# Patient Record
Sex: MALE | Race: WHITE | NOT HISPANIC OR LATINO | URBAN - METROPOLITAN AREA
[De-identification: names, ages, dates, MRNs, and addresses within clinical notes are randomized per-mention and may not be internally consistent; named-entity substitution may affect disease eponyms.]

---

## 2020-12-03 ENCOUNTER — OFFICE (AMBULATORY)
Dept: URBAN - METROPOLITAN AREA CLINIC 64 | Facility: CLINIC | Age: 75
End: 2020-12-03
Payer: COMMERCIAL

## 2020-12-03 VITALS
SYSTOLIC BLOOD PRESSURE: 157 MMHG | DIASTOLIC BLOOD PRESSURE: 83 MMHG | HEART RATE: 75 BPM | WEIGHT: 250 LBS | HEIGHT: 73 IN

## 2020-12-03 DIAGNOSIS — R13.10 DYSPHAGIA, UNSPECIFIED: ICD-10-CM

## 2020-12-03 DIAGNOSIS — Z86.010 PERSONAL HISTORY OF COLONIC POLYPS: ICD-10-CM

## 2020-12-03 DIAGNOSIS — R63.4 ABNORMAL WEIGHT LOSS: ICD-10-CM

## 2020-12-03 PROCEDURE — 99204 OFFICE O/P NEW MOD 45 MIN: CPT | Performed by: INTERNAL MEDICINE

## 2020-12-03 RX ORDER — PANTOPRAZOLE SODIUM 40 MG/1
40 TABLET, DELAYED RELEASE ORAL
Qty: 90 | Refills: 3 | Status: COMPLETED
Start: 2020-12-03 | End: 2020-12-10

## 2020-12-11 ENCOUNTER — ON CAMPUS - OUTPATIENT (AMBULATORY)
Dept: URBAN - METROPOLITAN AREA HOSPITAL 2 | Facility: HOSPITAL | Age: 75
End: 2020-12-11
Payer: COMMERCIAL

## 2020-12-11 ENCOUNTER — OFFICE (AMBULATORY)
Dept: URBAN - METROPOLITAN AREA PATHOLOGY 4 | Facility: PATHOLOGY | Age: 75
End: 2020-12-11
Payer: MEDICARE

## 2020-12-11 ENCOUNTER — OFFICE (AMBULATORY)
Dept: URBAN - METROPOLITAN AREA PATHOLOGY 4 | Facility: PATHOLOGY | Age: 75
End: 2020-12-11
Payer: COMMERCIAL

## 2020-12-11 VITALS
DIASTOLIC BLOOD PRESSURE: 88 MMHG | HEART RATE: 66 BPM | SYSTOLIC BLOOD PRESSURE: 142 MMHG | OXYGEN SATURATION: 97 % | HEART RATE: 62 BPM | OXYGEN SATURATION: 99 % | WEIGHT: 251 LBS | DIASTOLIC BLOOD PRESSURE: 92 MMHG | OXYGEN SATURATION: 96 % | RESPIRATION RATE: 16 BRPM | SYSTOLIC BLOOD PRESSURE: 152 MMHG | SYSTOLIC BLOOD PRESSURE: 162 MMHG | HEART RATE: 63 BPM | RESPIRATION RATE: 18 BRPM | SYSTOLIC BLOOD PRESSURE: 150 MMHG | DIASTOLIC BLOOD PRESSURE: 73 MMHG | DIASTOLIC BLOOD PRESSURE: 78 MMHG | HEIGHT: 73 IN | DIASTOLIC BLOOD PRESSURE: 80 MMHG | HEART RATE: 73 BPM | RESPIRATION RATE: 17 BRPM | SYSTOLIC BLOOD PRESSURE: 174 MMHG | TEMPERATURE: 96.9 F | OXYGEN SATURATION: 100 % | HEART RATE: 65 BPM

## 2020-12-11 DIAGNOSIS — K29.60 OTHER GASTRITIS WITHOUT BLEEDING: ICD-10-CM

## 2020-12-11 DIAGNOSIS — K44.9 DIAPHRAGMATIC HERNIA WITHOUT OBSTRUCTION OR GANGRENE: ICD-10-CM

## 2020-12-11 DIAGNOSIS — R63.4 ABNORMAL WEIGHT LOSS: ICD-10-CM

## 2020-12-11 DIAGNOSIS — R13.10 DYSPHAGIA, UNSPECIFIED: ICD-10-CM

## 2020-12-11 DIAGNOSIS — K22.2 ESOPHAGEAL OBSTRUCTION: ICD-10-CM

## 2020-12-11 DIAGNOSIS — K29.70 GASTRITIS, UNSPECIFIED, WITHOUT BLEEDING: ICD-10-CM

## 2020-12-11 PROBLEM — K31.89 OTHER DISEASES OF STOMACH AND DUODENUM: Status: ACTIVE | Noted: 2020-12-11

## 2020-12-11 LAB
GI HISTOLOGY: A. SELECT: (no result)
GI HISTOLOGY: PDF REPORT: (no result)

## 2020-12-11 PROCEDURE — 43450 DILATE ESOPHAGUS 1/MULT PASS: CPT | Performed by: INTERNAL MEDICINE

## 2020-12-11 PROCEDURE — 88305 TISSUE EXAM BY PATHOLOGIST: CPT | Mod: 26 | Performed by: INTERNAL MEDICINE

## 2020-12-11 PROCEDURE — 43239 EGD BIOPSY SINGLE/MULTIPLE: CPT | Performed by: INTERNAL MEDICINE

## 2021-03-22 ENCOUNTER — ON CAMPUS - OUTPATIENT (AMBULATORY)
Dept: URBAN - METROPOLITAN AREA HOSPITAL 2 | Facility: HOSPITAL | Age: 76
End: 2021-03-22
Payer: COMMERCIAL

## 2021-03-22 ENCOUNTER — OFFICE (AMBULATORY)
Dept: URBAN - METROPOLITAN AREA PATHOLOGY 4 | Facility: PATHOLOGY | Age: 76
End: 2021-03-22
Payer: COMMERCIAL

## 2021-03-22 VITALS
SYSTOLIC BLOOD PRESSURE: 117 MMHG | HEART RATE: 109 BPM | OXYGEN SATURATION: 100 % | SYSTOLIC BLOOD PRESSURE: 139 MMHG | SYSTOLIC BLOOD PRESSURE: 145 MMHG | HEART RATE: 65 BPM | DIASTOLIC BLOOD PRESSURE: 63 MMHG | WEIGHT: 249 LBS | HEART RATE: 57 BPM | OXYGEN SATURATION: 99 % | HEART RATE: 52 BPM | DIASTOLIC BLOOD PRESSURE: 97 MMHG | RESPIRATION RATE: 18 BRPM | TEMPERATURE: 97.7 F | DIASTOLIC BLOOD PRESSURE: 94 MMHG | SYSTOLIC BLOOD PRESSURE: 168 MMHG | DIASTOLIC BLOOD PRESSURE: 72 MMHG | HEIGHT: 73 IN | DIASTOLIC BLOOD PRESSURE: 87 MMHG | HEART RATE: 61 BPM | RESPIRATION RATE: 16 BRPM | SYSTOLIC BLOOD PRESSURE: 122 MMHG | OXYGEN SATURATION: 97 % | HEART RATE: 54 BPM | DIASTOLIC BLOOD PRESSURE: 92 MMHG | DIASTOLIC BLOOD PRESSURE: 73 MMHG | SYSTOLIC BLOOD PRESSURE: 141 MMHG | DIASTOLIC BLOOD PRESSURE: 98 MMHG | SYSTOLIC BLOOD PRESSURE: 138 MMHG | HEART RATE: 60 BPM | HEART RATE: 53 BPM | SYSTOLIC BLOOD PRESSURE: 126 MMHG

## 2021-03-22 DIAGNOSIS — D12.0 BENIGN NEOPLASM OF CECUM: ICD-10-CM

## 2021-03-22 DIAGNOSIS — D12.3 BENIGN NEOPLASM OF TRANSVERSE COLON: ICD-10-CM

## 2021-03-22 DIAGNOSIS — D12.2 BENIGN NEOPLASM OF ASCENDING COLON: ICD-10-CM

## 2021-03-22 DIAGNOSIS — D17.79 BENIGN LIPOMATOUS NEOPLASM OF OTHER SITES: ICD-10-CM

## 2021-03-22 DIAGNOSIS — K64.8 OTHER HEMORRHOIDS: ICD-10-CM

## 2021-03-22 DIAGNOSIS — Z86.010 PERSONAL HISTORY OF COLONIC POLYPS: ICD-10-CM

## 2021-03-22 PROBLEM — K63.5 POLYP OF COLON: Status: ACTIVE | Noted: 2021-03-22

## 2021-03-22 LAB
GI HISTOLOGY: A. UNSPECIFIED: (no result)
GI HISTOLOGY: B. UNSPECIFIED: (no result)
GI HISTOLOGY: C. UNSPECIFIED: (no result)
GI HISTOLOGY: PDF REPORT: (no result)

## 2021-03-22 PROCEDURE — 45379 COLONOSCOPY W/FB REMOVAL: CPT | Mod: PT,59 | Performed by: INTERNAL MEDICINE

## 2021-03-22 PROCEDURE — 88305 TISSUE EXAM BY PATHOLOGIST: CPT | Mod: 26 | Performed by: INTERNAL MEDICINE

## 2021-03-22 PROCEDURE — 45385 COLONOSCOPY W/LESION REMOVAL: CPT | Mod: PT | Performed by: INTERNAL MEDICINE

## 2021-03-22 PROCEDURE — 45379 COLONOSCOPY W/FB REMOVAL: CPT | Mod: 59,PT | Performed by: INTERNAL MEDICINE

## 2021-03-22 NOTE — SERVICEHPINOTES
THERESE RAMOS  is a  75  male   who presents today for a  Colonoscopy   for   the indications listed below. The updated Patient Profile was reviewed prior to the procedure, in conjunction with the Physical Exam, including medical conditions, surgical procedures, medications, allergies, family history and social history. See Physical Exam time stamp below for date and time of HPI completion.Pre-operatively, I reviewed the indication(s) for the procedure, the risks of the procedure [including but not limited to: unexpected bleeding possibly requiring hospitalization and/or unplanned repeat procedures, perforation possibly requiring surgical treatment, missed lesions and complications of sedation/MAC (also explained by anesthesia staff)]. I have evaluated the patient for risks associated with the planned anesthesia and the procedure to be performed and find the patient an acceptable candidate for IV sedation.Multiple opportunities were provided for any questions or concerns, and all questions were answered satisfactorily before any anesthesia was administered. We will proceed with the planned procedure.BR

## 2024-03-19 ENCOUNTER — ANESTHESIA EVENT (OUTPATIENT)
Age: 79
End: 2024-03-19
Payer: MEDICARE

## 2024-03-19 PROCEDURE — 93005 ELECTROCARDIOGRAM TRACING: CPT | Performed by: STUDENT IN AN ORGANIZED HEALTH CARE EDUCATION/TRAINING PROGRAM

## 2024-03-22 ENCOUNTER — HOSPITAL ENCOUNTER (OUTPATIENT)
Dept: CARDIOLOGY | Facility: HOSPITAL | Age: 79
Discharge: HOME | End: 2024-03-22
Payer: MEDICARE

## 2024-03-22 ENCOUNTER — LAB (OUTPATIENT)
Dept: LAB | Facility: HOSPITAL | Age: 79
End: 2024-03-22
Payer: MEDICARE

## 2024-03-22 PROCEDURE — 93005 ELECTROCARDIOGRAM TRACING: CPT

## 2024-03-22 PROCEDURE — 80048 BASIC METABOLIC PNL TOTAL CA: CPT | Performed by: OPHTHALMOLOGY

## 2024-03-26 ENCOUNTER — HOSPITAL ENCOUNTER (OUTPATIENT)
Facility: HOSPITAL | Age: 79
Setting detail: OBSERVATION
Discharge: HOME | End: 2024-03-27
Attending: EMERGENCY MEDICINE | Admitting: EMERGENCY MEDICINE
Payer: MEDICARE

## 2024-03-26 ENCOUNTER — APPOINTMENT (OUTPATIENT)
Dept: GENERAL RADIOLOGY | Facility: HOSPITAL | Age: 79
End: 2024-03-26
Payer: MEDICARE

## 2024-03-26 ENCOUNTER — ANESTHESIA (OUTPATIENT)
Age: 79
End: 2024-03-26
Payer: MEDICARE

## 2024-03-26 DIAGNOSIS — H02.402 PTOSIS OF LEFT EYELID: ICD-10-CM

## 2024-03-26 DIAGNOSIS — R06.09 DOE (DYSPNEA ON EXERTION): ICD-10-CM

## 2024-03-26 DIAGNOSIS — I48.91 NEW ONSET ATRIAL FIBRILLATION: Primary | ICD-10-CM

## 2024-03-26 LAB
ALBUMIN SERPL-MCNC: 3.9 G/DL (ref 3.5–5.2)
ALBUMIN/GLOB SERPL: 2.1 G/DL
ALP SERPL-CCNC: 43 U/L (ref 39–117)
ALT SERPL W P-5'-P-CCNC: 8 U/L (ref 1–41)
ANION GAP SERPL CALCULATED.3IONS-SCNC: 8 MMOL/L (ref 5–15)
AST SERPL-CCNC: 9 U/L (ref 1–40)
BASOPHILS # BLD AUTO: 0.04 10*3/MM3 (ref 0–0.2)
BASOPHILS NFR BLD AUTO: 0.5 % (ref 0–1.5)
BILIRUB SERPL-MCNC: 0.6 MG/DL (ref 0–1.2)
BUN SERPL-MCNC: 19 MG/DL (ref 8–23)
BUN/CREAT SERPL: 20 (ref 7–25)
CALCIUM SPEC-SCNC: 9.4 MG/DL (ref 8.6–10.5)
CHLORIDE SERPL-SCNC: 106 MMOL/L (ref 98–107)
CHOLEST SERPL-MCNC: 157 MG/DL (ref 0–200)
CO2 SERPL-SCNC: 28 MMOL/L (ref 22–29)
CREAT SERPL-MCNC: 0.95 MG/DL (ref 0.76–1.27)
DEPRECATED RDW RBC AUTO: 44.9 FL (ref 37–54)
EGFRCR SERPLBLD CKD-EPI 2021: 80.9 ML/MIN/1.73
EOSINOPHIL # BLD AUTO: 0.06 10*3/MM3 (ref 0–0.4)
EOSINOPHIL NFR BLD AUTO: 0.7 % (ref 0.3–6.2)
ERYTHROCYTE [DISTWIDTH] IN BLOOD BY AUTOMATED COUNT: 12.7 % (ref 12.3–15.4)
GEN 5 2HR TROPONIN T REFLEX: 35 NG/L
GLOBULIN UR ELPH-MCNC: 1.9 GM/DL
GLUCOSE SERPL-MCNC: 108 MG/DL (ref 65–99)
HBA1C MFR BLD: 5.2 % (ref 4.8–5.6)
HCT VFR BLD AUTO: 36.5 % (ref 37.5–51)
HDLC SERPL-MCNC: 56 MG/DL (ref 40–60)
HGB BLD-MCNC: 11.6 G/DL (ref 13–17.7)
HOLD SPECIMEN: NORMAL
HOLD SPECIMEN: NORMAL
IMM GRANULOCYTES # BLD AUTO: 0.02 10*3/MM3 (ref 0–0.05)
IMM GRANULOCYTES NFR BLD AUTO: 0.2 % (ref 0–0.5)
LDLC SERPL CALC-MCNC: 85 MG/DL (ref 0–100)
LDLC/HDLC SERPL: 1.51 {RATIO}
LYMPHOCYTES # BLD AUTO: 2.32 10*3/MM3 (ref 0.7–3.1)
LYMPHOCYTES NFR BLD AUTO: 27 % (ref 19.6–45.3)
MAGNESIUM SERPL-MCNC: 2 MG/DL (ref 1.6–2.4)
MCH RBC QN AUTO: 30.5 PG (ref 26.6–33)
MCHC RBC AUTO-ENTMCNC: 31.8 G/DL (ref 31.5–35.7)
MCV RBC AUTO: 96.1 FL (ref 79–97)
MONOCYTES # BLD AUTO: 0.53 10*3/MM3 (ref 0.1–0.9)
MONOCYTES NFR BLD AUTO: 6.2 % (ref 5–12)
NEUTROPHILS NFR BLD AUTO: 5.61 10*3/MM3 (ref 1.7–7)
NEUTROPHILS NFR BLD AUTO: 65.4 % (ref 42.7–76)
NRBC BLD AUTO-RTO: 0 /100 WBC (ref 0–0.2)
NT-PROBNP SERPL-MCNC: 2025 PG/ML (ref 0–1800)
PLATELET # BLD AUTO: 189 10*3/MM3 (ref 140–450)
PMV BLD AUTO: 9.6 FL (ref 6–12)
POTASSIUM SERPL-SCNC: 4 MMOL/L (ref 3.5–5.2)
PROT SERPL-MCNC: 5.8 G/DL (ref 6–8.5)
RBC # BLD AUTO: 3.8 10*6/MM3 (ref 4.14–5.8)
SODIUM SERPL-SCNC: 142 MMOL/L (ref 136–145)
T4 FREE SERPL-MCNC: 1.25 NG/DL (ref 0.93–1.7)
TRIGL SERPL-MCNC: 83 MG/DL (ref 0–150)
TROPONIN T DELTA: -6 NG/L
TROPONIN T SERPL HS-MCNC: 41 NG/L
TSH SERPL DL<=0.05 MIU/L-ACNC: 0.52 UIU/ML (ref 0.27–4.2)
VLDLC SERPL-MCNC: 16 MG/DL (ref 5–40)
WBC NRBC COR # BLD AUTO: 8.58 10*3/MM3 (ref 3.4–10.8)
WHOLE BLOOD HOLD COAG: NORMAL
WHOLE BLOOD HOLD SPECIMEN: NORMAL

## 2024-03-26 PROCEDURE — 80061 LIPID PANEL: CPT | Performed by: NURSE PRACTITIONER

## 2024-03-26 PROCEDURE — 99204 OFFICE O/P NEW MOD 45 MIN: CPT | Performed by: INTERNAL MEDICINE

## 2024-03-26 PROCEDURE — 93005 ELECTROCARDIOGRAM TRACING: CPT

## 2024-03-26 PROCEDURE — 96372 THER/PROPH/DIAG INJ SC/IM: CPT

## 2024-03-26 PROCEDURE — 99285 EMERGENCY DEPT VISIT HI MDM: CPT

## 2024-03-26 PROCEDURE — 71045 X-RAY EXAM CHEST 1 VIEW: CPT

## 2024-03-26 PROCEDURE — 84484 ASSAY OF TROPONIN QUANT: CPT | Performed by: EMERGENCY MEDICINE

## 2024-03-26 PROCEDURE — 84443 ASSAY THYROID STIM HORMONE: CPT | Performed by: NURSE PRACTITIONER

## 2024-03-26 PROCEDURE — 83036 HEMOGLOBIN GLYCOSYLATED A1C: CPT | Performed by: NURSE PRACTITIONER

## 2024-03-26 PROCEDURE — 84484 ASSAY OF TROPONIN QUANT: CPT | Performed by: NURSE PRACTITIONER

## 2024-03-26 PROCEDURE — 25010000002 ENOXAPARIN PER 10 MG: Performed by: NURSE PRACTITIONER

## 2024-03-26 PROCEDURE — G0378 HOSPITAL OBSERVATION PER HR: HCPCS

## 2024-03-26 PROCEDURE — 96376 TX/PRO/DX INJ SAME DRUG ADON: CPT

## 2024-03-26 PROCEDURE — 84439 ASSAY OF FREE THYROXINE: CPT | Performed by: NURSE PRACTITIONER

## 2024-03-26 PROCEDURE — 83880 ASSAY OF NATRIURETIC PEPTIDE: CPT | Performed by: EMERGENCY MEDICINE

## 2024-03-26 PROCEDURE — 83735 ASSAY OF MAGNESIUM: CPT | Performed by: NURSE PRACTITIONER

## 2024-03-26 PROCEDURE — 25010000002 FUROSEMIDE PER 20 MG: Performed by: NURSE PRACTITIONER

## 2024-03-26 PROCEDURE — 80053 COMPREHEN METABOLIC PANEL: CPT | Performed by: EMERGENCY MEDICINE

## 2024-03-26 PROCEDURE — 96374 THER/PROPH/DIAG INJ IV PUSH: CPT

## 2024-03-26 PROCEDURE — 85025 COMPLETE CBC W/AUTO DIFF WBC: CPT | Performed by: EMERGENCY MEDICINE

## 2024-03-26 RX ORDER — TAMSULOSIN HYDROCHLORIDE 0.4 MG/1
0.8 CAPSULE ORAL DAILY
Status: DISCONTINUED | OUTPATIENT
Start: 2024-03-26 | End: 2024-03-27 | Stop reason: HOSPADM

## 2024-03-26 RX ORDER — SODIUM CHLORIDE 0.9 % (FLUSH) 0.9 %
10 SYRINGE (ML) INJECTION AS NEEDED
Status: DISCONTINUED | OUTPATIENT
Start: 2024-03-26 | End: 2024-03-27 | Stop reason: HOSPADM

## 2024-03-26 RX ORDER — SODIUM CHLORIDE 9 MG/ML
40 INJECTION, SOLUTION INTRAVENOUS AS NEEDED
Status: DISCONTINUED | OUTPATIENT
Start: 2024-03-26 | End: 2024-03-27 | Stop reason: HOSPADM

## 2024-03-26 RX ORDER — ONDANSETRON 2 MG/ML
4 INJECTION INTRAMUSCULAR; INTRAVENOUS EVERY 6 HOURS PRN
Status: DISCONTINUED | OUTPATIENT
Start: 2024-03-26 | End: 2024-03-27 | Stop reason: HOSPADM

## 2024-03-26 RX ORDER — BISACODYL 10 MG
10 SUPPOSITORY, RECTAL RECTAL DAILY PRN
Status: DISCONTINUED | OUTPATIENT
Start: 2024-03-26 | End: 2024-03-27 | Stop reason: HOSPADM

## 2024-03-26 RX ORDER — FUROSEMIDE 10 MG/ML
40 INJECTION INTRAMUSCULAR; INTRAVENOUS EVERY 12 HOURS
Status: DISCONTINUED | OUTPATIENT
Start: 2024-03-26 | End: 2024-03-27 | Stop reason: HOSPADM

## 2024-03-26 RX ORDER — ONDANSETRON 4 MG/1
4 TABLET, ORALLY DISINTEGRATING ORAL EVERY 6 HOURS PRN
Status: DISCONTINUED | OUTPATIENT
Start: 2024-03-26 | End: 2024-03-27 | Stop reason: HOSPADM

## 2024-03-26 RX ORDER — PANTOPRAZOLE SODIUM 40 MG/10ML
40 INJECTION, POWDER, LYOPHILIZED, FOR SOLUTION INTRAVENOUS
Status: DISCONTINUED | OUTPATIENT
Start: 2024-03-27 | End: 2024-03-27 | Stop reason: HOSPADM

## 2024-03-26 RX ORDER — BISACODYL 5 MG/1
5 TABLET, DELAYED RELEASE ORAL DAILY PRN
Status: DISCONTINUED | OUTPATIENT
Start: 2024-03-26 | End: 2024-03-27 | Stop reason: HOSPADM

## 2024-03-26 RX ORDER — POLYETHYLENE GLYCOL 3350 17 G/17G
17 POWDER, FOR SOLUTION ORAL DAILY PRN
Status: DISCONTINUED | OUTPATIENT
Start: 2024-03-26 | End: 2024-03-27 | Stop reason: HOSPADM

## 2024-03-26 RX ORDER — AMOXICILLIN 250 MG
2 CAPSULE ORAL 2 TIMES DAILY PRN
Status: DISCONTINUED | OUTPATIENT
Start: 2024-03-26 | End: 2024-03-27 | Stop reason: HOSPADM

## 2024-03-26 RX ORDER — SODIUM CHLORIDE 0.9 % (FLUSH) 0.9 %
10 SYRINGE (ML) INJECTION EVERY 12 HOURS SCHEDULED
Status: DISCONTINUED | OUTPATIENT
Start: 2024-03-26 | End: 2024-03-27 | Stop reason: HOSPADM

## 2024-03-26 RX ORDER — HYDRALAZINE HYDROCHLORIDE 20 MG/ML
10 INJECTION INTRAMUSCULAR; INTRAVENOUS EVERY 6 HOURS PRN
Status: DISCONTINUED | OUTPATIENT
Start: 2024-03-26 | End: 2024-03-27 | Stop reason: HOSPADM

## 2024-03-26 RX ORDER — ENOXAPARIN SODIUM 100 MG/ML
40 INJECTION SUBCUTANEOUS DAILY
Status: DISCONTINUED | OUTPATIENT
Start: 2024-03-26 | End: 2024-03-27 | Stop reason: HOSPADM

## 2024-03-26 RX ORDER — ASPIRIN 81 MG/1
81 TABLET ORAL DAILY
Status: DISCONTINUED | OUTPATIENT
Start: 2024-03-27 | End: 2024-03-27 | Stop reason: HOSPADM

## 2024-03-26 RX ORDER — NITROGLYCERIN 0.4 MG/1
0.4 TABLET SUBLINGUAL
Status: DISCONTINUED | OUTPATIENT
Start: 2024-03-26 | End: 2024-03-27 | Stop reason: HOSPADM

## 2024-03-26 RX ORDER — ACETAMINOPHEN 325 MG/1
650 TABLET ORAL EVERY 4 HOURS PRN
Status: DISCONTINUED | OUTPATIENT
Start: 2024-03-26 | End: 2024-03-27 | Stop reason: HOSPADM

## 2024-03-26 RX ORDER — HYDROCODONE BITARTRATE AND ACETAMINOPHEN 10; 325 MG/1; MG/1
1 TABLET ORAL EVERY 6 HOURS PRN
Status: DISCONTINUED | OUTPATIENT
Start: 2024-03-26 | End: 2024-03-27 | Stop reason: HOSPADM

## 2024-03-26 RX ORDER — FUROSEMIDE 10 MG/ML
40 INJECTION INTRAMUSCULAR; INTRAVENOUS ONCE
Status: COMPLETED | OUTPATIENT
Start: 2024-03-26 | End: 2024-03-26

## 2024-03-26 RX ADMIN — ENOXAPARIN SODIUM 40 MG: 100 INJECTION SUBCUTANEOUS at 13:57

## 2024-03-26 RX ADMIN — HYDROCODONE BITARTRATE AND ACETAMINOPHEN 1 TABLET: 10; 325 TABLET ORAL at 18:17

## 2024-03-26 RX ADMIN — Medication 10 ML: at 22:10

## 2024-03-26 RX ADMIN — Medication 10 ML: at 13:14

## 2024-03-26 RX ADMIN — FUROSEMIDE 40 MG: 10 INJECTION, SOLUTION INTRAMUSCULAR; INTRAVENOUS at 18:17

## 2024-03-26 RX ADMIN — FUROSEMIDE 40 MG: 10 INJECTION, SOLUTION INTRAMUSCULAR; INTRAVENOUS at 13:56

## 2024-03-26 RX ADMIN — TAMSULOSIN HYDROCHLORIDE 0.8 MG: 0.4 CAPSULE ORAL at 22:10

## 2024-03-26 NOTE — ED PROVIDER NOTES
Subjective   History of Present Illness  Chief Complaint: Abnormal EKG    Patient is a 78-year-old male with history of CAD GERD presents to the ER with reports of abnormal EKG.  Patient states that he was at the surgical center to have surgery on his right thigh.  Patient was in preop when they noticed that he had an abnormal EKG and reported dyspnea on exertion.  Patient did not have the surgery was sent to the ER for evaluation.  Patient EKG reads atrial fibrillation with a rate of 73.  He denies any chest pain at this time.  Patient did receive 324 mg aspirin prior to ER arrival.  No history of A-fib per family.  He denies any shortness of breath currently but states that he does have shortness of breath with exertion.  He has some mild lower extremity swelling.  No abdominal pain nausea vomiting diarrhea.  No fever or chills.  Patient states he does not have a cardiologist.    PCP: Sybil Fleming    History provided by:  Patient      Review of Systems   Constitutional:  Negative for fever.   HENT:  Negative for sore throat and trouble swallowing.    Eyes: Negative.    Respiratory:  Positive for shortness of breath. Negative for chest tightness and wheezing.    Cardiovascular:  Positive for palpitations. Negative for chest pain.   Gastrointestinal:  Negative for abdominal pain, diarrhea, nausea and vomiting.   Endocrine: Negative.    Genitourinary:  Negative for dysuria.   Musculoskeletal:  Negative for myalgias.   Skin:  Negative for rash.   Allergic/Immunologic: Negative.    Neurological:  Negative for weakness and headaches.   Psychiatric/Behavioral:  Negative for behavioral problems.    All other systems reviewed and are negative.      Past Medical History:   Diagnosis Date    Arthritis     Back pain     Blind right eye     CAD (coronary artery disease)     Esophagus disorder     several dilations    GERD (gastroesophageal reflux disease)     History of BPH     [place] (hard of hearing)     Lumbar stenosis with  neurogenic claudication     Seasonal allergies        No Known Allergies    Past Surgical History:   Procedure Laterality Date    BACK SURGERY      x3    CHOLECYSTECTOMY      COLONOSCOPY W/ POLYPECTOMY      CORONARY ANGIOPLASTY WITH STENT PLACEMENT      2013    ESOPHAGEAL DILATATION      EYE SURGERY      SPINAL FUSION      TOTAL HIP ARTHROPLASTY         Family History   Problem Relation Age of Onset    Malig Hyperthermia Neg Hx        Social History     Socioeconomic History    Marital status:    Tobacco Use    Smoking status: Never   Vaping Use    Vaping status: Never Used   Substance and Sexual Activity    Alcohol use: Never    Drug use: Never    Sexual activity: Defer           Objective   Physical Exam  Vitals and nursing note reviewed.   Constitutional:       Appearance: He is well-developed. He is obese.   Eyes:      Pupils: Pupils are equal, round, and reactive to light.      Comments: Blind right eye   Cardiovascular:      Rate and Rhythm: Normal rate. Rhythm irregular.      Heart sounds: Normal heart sounds. No murmur heard.  Pulmonary:      Breath sounds: Normal breath sounds. No wheezing or rhonchi.   Chest:      Chest wall: No tenderness.   Abdominal:      Palpations: Abdomen is soft.      Tenderness: There is no abdominal tenderness.   Musculoskeletal:      Cervical back: Normal range of motion.      Right lower leg: Edema present.      Left lower leg: Edema present.   Skin:     General: Skin is warm.      Capillary Refill: Capillary refill takes less than 2 seconds.   Neurological:      General: No focal deficit present.      Mental Status: He is alert and oriented to person, place, and time.   Psychiatric:         Mood and Affect: Mood normal.         Behavior: Behavior normal.         ECG 12 Lead      Date/Time: 3/26/2024 10:53 AM    Performed by: Beti Monaco PA  Authorized by: Logan Caruso MD  Interpreted by ED physician  Previous ECG: no previous ECG available  Rhythm: atrial  "fibrillation  Rate: normal  BPM: 87  QRS axis: normal  Conduction: conduction normal  Clinical impression: abnormal ECG               ED Course  ED Course as of 03/26/24 1258   Tue Mar 26, 2024   1113 Spoke with Sulma Mojica NP who agreed to accept patient for observation admission. [MC]      ED Course User Index  [MC] Tho[place] Lily, PA    /69   Pulse 61   Temp 98.2 °F (36.8 °C) (Oral)   Resp 18   Ht 182.9 cm (72\")   Wt 110 kg (242 lb 8.1 oz)   SpO2 98%   BMI 32.89 kg/m²   Labs Reviewed   BNP (IN-HOUSE) - Abnormal; Notable for the following components:       Result Value    proBNP 2,025.0 (*)     All other components within normal limits    Narrative:     This assay is used as an aid in the diagnosis of individuals suspected of having heart failure. It can be used as an aid in the diagnosis of acute decompensated heart failure (ADHF) in patients presenting with signs and symptoms of ADHF to the emergency department (ED). In addition, NT-proBNP of <300 pg/mL indicates ADHF is not likely.    Age Range Result Interpretation  NT-proBNP Concentration (pg/mL:      <50             Positive            >450                   Gray                 300-450                    Negative             <300    50-75           Positive            >900                  Gray                300-900                  Negative            <300      >75             Positive            >1800                  Gray                300-1800                  Negative            <300   COMPREHENSIVE METABOLIC PANEL - Abnormal; Notable for the following components:    Glucose 108 (*)     Total Protein 5.8 (*)     All other components within normal limits    Narrative:     GFR Normal >60  Chronic Kidney Disease <60  Kidney Failure <15    The GFR formula is only valid for adults with stable renal function between ages 18 and 70.   TROPONIN - Abnormal; Notable for the following components:    HS Troponin T 41 (*)     All other components " within normal limits    Narrative:     High Sensitive Troponin T Reference Range:  <14.0 ng/L- Negative Female for AMI  <22.0 ng/L- Negative Male for AMI  >=14 - Abnormal Female indicating possible myocardial injury.  >=22 - Abnormal Male indicating possible myocardial injury.   Clinicians would have to utilize clinical acumen, EKG, Troponin, and serial changes to determine if it is an Acute Myocardial Infarction or myocardial injury due to an underlying chronic condition.        CBC WITH AUTO DIFFERENTIAL - Abnormal; Notable for the following components:    RBC 3.80 (*)     Hemoglobin 11.6 (*)     Hematocrit 36.5 (*)     All other components within normal limits   RAINBOW DRAW    Narrative:     The following orders were created for panel order [place] Draw.  Procedure                               Abnormality         Status                     ---------                               -----------         ------                     Green Top (Gel)[[id number]]                                  Final result               Lavender Top[[id number]]                                     Final result               Gold Top - SST[[id number]]                                   Final result               Light Blue Top[[id number]]                                   Final result                 Please view results for these tests on the individual orders.   HIGH SENSITIVITIY TROPONIN T 2HR   TSH   T4, FREE   GREEN TOP   LAVENDER TOP   GOLD TOP - SST   LIGHT BLUE TOP   CBC AND DIFFERENTIAL    Narrative:     The following orders were created for panel order CBC & Differential.  Procedure                               Abnormality         Status                     ---------                               -----------         ------                     CBC Auto Differential[[id number]]        Abnormal            Final result                 Please view results for these tests on the individual orders.     Medications   sodium chloride 0.9 %  flush 10 mL (has no administration in time range)   sodium chloride 0.9 % flush 10 mL (has no administration in time range)   sodium chloride 0.9 % flush 10 mL (has no administration in time range)   sodium chloride 0.9 % flush 10 mL (has no administration in time range)   sodium chloride 0.9 % infusion 40 mL (has no administration in time range)   Enoxaparin Sodium (LOVENOX) syringe 40 mg (has no administration in time range)   nitroglycerin (NITROSTAT) SL tablet 0.4 mg (has no administration in time range)   sennosides-docusate (PERICOLACE) 8.6-50 MG per tablet 2 tablet (has no administration in time range)     And   polyethylene glycol (MIRALAX) packet 17 g (has no administration in time range)     And   bisacodyl (DULCOLAX) EC tablet 5 mg (has no administration in time range)     And   bisacodyl (DULCOLAX) suppository 10 mg (has no administration in time range)   acetaminophen (TYLENOL) tablet 650 mg (has no administration in time range)   ondansetron ODT (ZOFRAN-ODT) disintegrating tablet 4 mg (has no administration in time range)     Or   ondansetron (ZOFRAN) injection 4 mg (has no administration in time range)   HYDROcodone-acetaminophen (NORCO)  MG per tablet 1 tablet (has no administration in time range)   tamsulosin (FLOMAX) 24 hr capsule 0.8 mg (has no administration in time range)     XR Chest 1 View    Result Date: 3/26/2024  Impression: Somewhat limited exam. Stable cardiomegaly. No acute process. Electronically Signed: Saskia Fernandez MD  3/26/2024 11:11 AM EDT  Workstation ID: [id number]                                            Medical Decision Making  Differential Dx (Includes but not limited to): Dysrhythmia, electro abnormality, CHF exacerbation pneumonia  Medical Records Reviewed: No recent hospital admission.  No recent cardiac workup.  No recent stress test or echo.  Labs: On my interpretation, CBC no leukocytosis.  Troponin 41.  CMP glucose 108.  BNP 2025  Imaging: On my interpretation,  chest x-ray shows no obvious pneumonia  Telemetry: EKG interpretation reviewed by myself interpreted by ER attending, Dr. Caruso atrial fibrillation rate of 73 with no acute ST changes.  PVC  Testing considered but not ordered: CT head patient denies headache or head injury  Nature of Complaint: Acute  Admission vs Discharge: Admission  Discussion: While in the ED IV was placed and labs were obtained appropriate PPE was worn during exam and throughout all encounters with the patient.  Patient had the above evaluation.  He is afebrile nontoxic and is in no acute distress.  He is asymptomatic currently.  Denies chest pain.  Patient was sent to the ER for abnormal EKG.  EKG currently reading A-fib rate controlled 73.  No history of A-fib.  Previous EKG from 1 week ago was patient's preop testing that also showed atrial fibrillation but was noted that EKG before this was atrial ectopic rhythm.  Patient was given aspirin prior to ER arrival.  Patient states he does not have a cardiologist.  Lab work otherwise as noted above, mildly elevated troponin, BNP.  He does have some lower extremity swelling.  Patient will be placed in ED observation for serial troponins, cardiac echo and cardiology consultation for new onset atrial fibrillation.  I spoke with Sulma Mojica NP who agreed except patient for observation admission.    Case was discussed with ER attending, Dr. Caruso as well who agreed with plan of care.    Problems Addressed:  FARIA (dyspnea on exertion): acute illness or injury  New onset atrial fibrillation: acute illness or injury    Amount and/or Complexity of Data Reviewed  Labs: ordered. Decision-making details documented in ED Course.  Radiology: ordered. Decision-making details documented in ED Course.  ECG/medicine tests: ordered. Decision-making details documented in ED Course.    Risk  Prescription drug management.  Decision regarding hospitalization.        Final diagnoses:   New onset atrial fibrillation    FARIA (dyspnea on exertion)       ED Disposition  ED Disposition       ED Disposition   Decision to Admit    Condition   --    Comment   --               No follow-up provider specified.       Medication List      No changes were made to your prescriptions during this visit.            Beti Monaco PA  03/26/24 [id number]

## 2024-03-26 NOTE — ANESTHESIA PREPROCEDURE EVALUATION
Anesthesia Evaluation     Patient summary reviewed and Nursing notes reviewed   no history of anesthetic complications:   NPO Solid Status: > 8 hours  NPO Liquid Status: > 2 hours           Airway   Mallampati: II  TM distance: >3 FB  Neck ROM: full  Dental      Pulmonary    Cardiovascular     ECG reviewed    (+) CAD, cardiac stents     ROS comment: New onset A-fib    Neuro/Psych  GI/Hepatic/Renal/Endo    (+) obesity, GERD    Musculoskeletal     (+) back pain, chronic pain  Abdominal    Substance History      OB/GYN          Other                      Anesthesia Plan    ASA 4     (Patient's surgery canceled. He presents with a known history of CAD s/p stenting but its not on appropriate CAD medications. He also has new onset A-fib. Patient is not currently having any CP,SOB or light headedness but he does endorse exertional chest pain after working in the yard. Will transfer patient to ED to be evaluated by cardiologist and started on appropriate therapy. )  intravenous induction     Anesthetic plan, risks, benefits, and alternatives have been provided, discussed and informed consent has been obtained with: patient.    CODE STATUS:

## 2024-03-26 NOTE — CONSULTS
Cardiology Consult Note    Patient Identification:  Name: Rufino Vasques  Age: 78 y.o.  Sex: male  :  1945  MRN: [id number]             Requesting Physician :  CAMILLA Herron ED Observation unit     Reason for Consultation / Chief Complaint : patient co-management  New onset afib     History of Present Illness:      Mr. Rufino Vasques has a PMH of     - CAD status post PCI ( details not available)   - history of Endocarditis (treated with 6 weeks IV abx)  - GERD/previous esophageal dilations  - Chronic back pain secondary to spinal stenosis ([facility])  - Back surgeries x 4, right eye surgery x 5, hip replacement, cholecystectomy  - Blindness in right eye, ptosis left eye, hard of hearing  - Family history of father with valvular disease.  - No known allergies  - Non-smoker      Presented to the ED this morning from surgical center with abnormal EKG.  Patient was being prepped for left eye surgery due to ptosis.  His surgery was canceled and he was transferred to the emergency room as his EKG showed A-fib.  Patient denies any history of A-fib.  He denies any current chest pain or shortness of breath.  However he reports that he gets tired quicker and he had an aching in his chest after he had worked in his yard.       Labs in the ED showed high-sensitivity troponin of 41--35, proBNP  glucose 108 hemoglobin A1c 5.2 TSH normal hemoglobin 11.6 lipid panel normal x-ray shows stable cardiomegaly with no acute process    Preop EKG on 3/22/2024 showed atrial fibs with a rate 64 abnormal T waves  EKG today 3/26/2024 showed normal sinus rhythm with PVC    Plans for echocardiogram and stress stress   Neurology consult for Ptosis- discussed with CAMILLA Ozuna   Start aspirin.     [id number] SCORE   [id number] Score: 3 (3/26/2024  2:29 PM)    Electronically signed by CAMILLA Brennan, 24, 2:29 PM EDT.  Cardiology attending addendum :    I have personally performed a  face-to-face diagnostic evaluation, physical exam and reviewed data on this patient.  I have reviewed documentation done by me and nurse practitioner  and corrected as needed.  And agree with the different components of documentation.Greater than 50% of the time spent in the care of this patient was provided by attending consultant/me.            Assessment:  :    -Atrial fibrillation  -Chest pain with exertion concerning for new onset angina  -CAD history of PCI 2013  -Questionable history of endocarditis previously took antibiotics for 6 weeks  -Elevated proBNP at 2025  -Blind in right eye due to retinal detachment      Recommendations / Plan:        EKG done 3/26/2024 reviewed/elevated by me reveals A-fib at the rate of 73 bpm.  Will monitor telemetry  Patient workup has revealed troponin of 41, will check serial troponins  proBNP is elevated.  Will check an echocardiogram.  Will check stress test to evaluate exertional chest pain concerning for new onset angina.  Patient has history of CAD PCI in the past and not followed up with cardiology or takes any medication given aspirin.  Counseled on importance of compliance.  Will start him on aspirin and statins and beta-blockers as tolerated.  Patient has high CVS5SV0-VALs score will benefit from long-term anticoagulation to prevent thromboembolic events.  Will follow-up and start the same.  TSH is normal at 0.515 and FT4 is normal.  Patient is preop for left eye surgery for ptosis.  Will consult neurology to see the etiology of his ptosis.           Diagnosis Plan   1. New onset atrial fibrillation        2. FARIA (dyspnea on exertion)                   Past Medical History:  Past Medical History:   Diagnosis Date    Arthritis     Back pain     Blind right eye     CAD (coronary artery disease)     Esophagus disorder     several dilations    GERD (gastroesophageal reflux disease)     History of BPH     [place] (hard of hearing)     Lumbar stenosis with neurogenic  claudication     Seasonal allergies      Past Surgical History:  Past Surgical History:   Procedure Laterality Date    BACK SURGERY      x3    CHOLECYSTECTOMY      COLONOSCOPY W/ POLYPECTOMY      CORONARY ANGIOPLASTY WITH STENT PLACEMENT      2013    ESOPHAGEAL DILATATION      EYE SURGERY      SPINAL FUSION      TOTAL HIP ARTHROPLASTY        Allergies:  No Known Allergies  Home Meds:  Medications Prior to Admission   Medication Sig Dispense Refill Last Dose    HYDROcodone-acetaminophen (NORCO)  MG per tablet Take 1 tablet by mouth Every 6 (Six) Hours As Needed for Moderate Pain.   3/26/2024    tamsulosin (FLOMAX) 0.4 MG capsule 24 hr capsule Take 2 capsules by mouth every night at bedtime.   Past Month     Current Meds:     Current Facility-Administered Medications:     acetaminophen (TYLENOL) tablet 650 mg, 650 mg, Oral, Q4H PRN, Sulma Mojica APRN    [START ON 3/27/2024] aspirin EC tablet 81 mg, 81 mg, Oral, Daily, Debby Spencer APRN    sennosides-docusate (PERICOLACE) 8.6-50 MG per tablet 2 tablet, 2 tablet, Oral, BID PRN **AND** polyethylene glycol (MIRALAX) packet 17 g, 17 g, Oral, Daily PRN **AND** bisacodyl (DULCOLAX) EC tablet 5 mg, 5 mg, Oral, Daily PRN **AND** bisacodyl (DULCOLAX) suppository 10 mg, 10 mg, Rectal, Daily PRN, Sulma Mojica APRN    Enoxaparin Sodium (LOVENOX) syringe 40 mg, 40 mg, Subcutaneous, Daily, Sulma Mojica APRN, 40 mg at 03/26/24 1357    furosemide (LASIX) injection 40 mg, 40 mg, Intravenous, Q12H, Sulma Mojica APRN    hydrALAZINE (APRESOLINE) injection 10 mg, 10 mg, Intravenous, Q6H PRN, Sulma Mojica APRN    HYDROcodone-acetaminophen (NORCO)  MG per tablet 1 tablet, 1 tablet, Oral, Q6H PRN, Sulma Mojica APRN    nitroglycerin (NITROSTAT) SL tablet 0.4 mg, 0.4 mg, Sublingual, Q5 Min PRN, Sulma Mojica APRN    ondansetron ODT (ZOFRAN-ODT) disintegrating tablet 4 mg, 4 mg, Oral, Q6H PRN **OR** ondansetron (ZOFRAN) injection 4 mg, 4 mg, Intravenous, Q6H PRN,  "Sulma Mojica APRN    [START ON 3/27/2024] pantoprazole (PROTONIX) injection 40 mg, 40 mg, Intravenous, Q AM, Sulma Mojica APRN    sodium chloride 0.9 % flush 10 mL, 10 mL, Intravenous, PRN, Sulma Mojica APRN    sodium chloride 0.9 % flush 10 mL, 10 mL, Intravenous, PRN, Sulma Mojica APRN    sodium chloride 0.9 % flush 10 mL, 10 mL, Intravenous, Q12H, Sulma Mojica APRN, 10 mL at 03/26/24 1314    sodium chloride 0.9 % flush 10 mL, 10 mL, Intravenous, PRN, Sulma Mojica APRN    sodium chloride 0.9 % infusion 40 mL, 40 mL, Intravenous, PRN, Sulma Mojica APRN    tamsulosin (FLOMAX) 24 hr capsule 0.8 mg, 0.8 mg, Oral, Daily, Sulma Mojica APRN  Social History:   Social History     Tobacco Use    Smoking status: Never    Smokeless tobacco: Not on file   Substance Use Topics    Alcohol use: Never      Family History:  Family History   Problem Relation Age of Onset    Malig Hyperthermia Neg Hx         Review of Systems : Review of Systems   Constitutional: Positive for malaise/fatigue.   Cardiovascular:  Positive for chest pain (post exertion) and dyspnea on exertion.   Respiratory:  Negative for cough and shortness of breath.    Musculoskeletal:  Positive for back pain and joint pain.        Chronic     Gastrointestinal:  Negative for abdominal pain, nausea and vomiting.   All other systems reviewed and are negative.               Constitutional:  Temp:  [97.6 °F (36.4 °C)-98.4 °F (36.9 °C)] 97.6 °F (36.4 °C)  Heart Rate:  [52-78] 65  Resp:  [12-18] 16  BP: (130-168)/(68-87) 145/69    Physical Exam   /69 (BP Location: Right arm, Patient Position: Lying)   Pulse 65   Temp 97.6 °F (36.4 °C) (Oral)   Resp 16   Ht 182.9 cm (72\")   Wt 110 kg (242 lb 8.1 oz)   SpO2 99%   BMI 32.89 kg/m²   Physical Exam  General:  Appears in no acute distress  Eyes: Sclerae are anicteric,  conjunctivae are clear   HEENT:  No JVD.  Blind in right eye.  Respiratory: Respirations regular and unlabored at rest.  Clear to " auscultation  Cardiovascular: S1,S2, irregularly irregular rate and rhythm no murmur, rub or gallop auscultated. No pretibial pitting edema  Gastrointestinal: Abdomen soft, flat, nontender. Bowel sounds present.   Musculoskeletal:  No abnormal movements  Extremities: No digital clubbing or cyanosis  Skin: Color pink.   Neuro: Alert and awake, no lateralizing deficits appreciated    Cardiographics  ECG: EKG tracing was  personally reviewed/interpreted by me  ECG 12 Lead   ED Interpretation   Abnormal   Beti Monaco PA     3/26/2024 12:58 PM   ECG 12 Lead         Date/Time: 3/26/2024 10:53 AM      Performed by: Beti Monaco PA   Authorized by: Logan Caruso MD  Interpreted by ED physician   Previous ECG: no previous ECG available   Rhythm: atrial fibrillation   Rate: normal   BPM: 87   QRS axis: normal   Conduction: conduction normal   Clinical impression: abnormal ECG      ECG 12 Lead Chest Pain   Preliminary Result   HEART RATE= 73  bpm   RR Interval= 816  ms   CA Interval=   ms   P Horizontal Axis=   deg   P Front Axis=   deg   QRSD Interval= 106  ms   QT Interval= 435  ms   QTcB= 482  ms   QRS Axis= -24  deg   T Wave Axis= -6  deg   - ABNORMAL ECG -   Atrial fibrillation   Ventricular premature complex   Electronically Signed By:    Date and Time of Study: 2024-03-26 10:22:25          Telemetry: Atrial fibrillation with controlled ventricular response    Echocardiogram:       Imaging  Chest X-ray:   Imaging Results (Last 24 Hours)       Procedure Component Value Units Date/Time    XR Chest 1 View [[id number]] Collected: 03/26/24 1110     Updated: 03/26/24 1113    Narrative:      XR CHEST 1 VW    Date of Exam: 3/26/2024 11:05 AM EDT    Indication: Chest Pain Protocol  Chest Pain Protocol    Comparison: 8/24/2021.    Findings:  There is stable mild to moderate cardiomegaly. There are low lung volumes with poor inspiration. There is mild crowding of vascular markings. There are no definite acute  infiltrates or effusions.      Impression:      Impression:  Somewhat limited exam. Stable cardiomegaly. No acute process.      Electronically Signed: Saskia Fernandez MD    3/26/2024 11:11 AM EDT    Workstation ID: [id number]            Lab Review: I have reviewed the labs  Results from last 7 days   Lab Units 03/26/24  1313 03/26/24  1051   HSTROP T ng/L 35* 41*     Results from last 7 days   Lab Units 03/26/24  1313   MAGNESIUM mg/dL 2.0     Results from last 7 days   Lab Units 03/26/24  1051   SODIUM mmol/L 142   POTASSIUM mmol/L 4.0   BUN mg/dL 19   CREATININE mg/dL 0.95   CALCIUM mg/dL 9.4         Results from last 7 days   Lab Units 03/26/24  1051   PROBNP pg/mL 2,025.0*     Results from last 7 days   Lab Units 03/26/24  1051   WBC 10*3/mm3 8.58   HEMOGLOBIN g/dL 11.6*   HEMATOCRIT % 36.5*   PLATELETS 10*3/mm3 189                 Pieter Aguilar MD  3/26/2024, 16:45 EDT      EMR Dragon/Transcription:   Dictated utilizing Dragon dictation

## 2024-03-26 NOTE — H&P
[facility] Observation Unit H&P    Patient Name: Rufino Vasques  : 1945  MRN: [id number]  Primary Care Physician: Sybil Fleming MD  Date of admission: 3/26/2024     Patient Care Team:  Sybil Fleming MD as PCP - General (Internal Medicine)          Subjective   History Present Illness     Chief Complaint:   Chief Complaint   Patient presents with    Chest Pain     Pt was scheduled for eye surgery and was being prepped for the surgery today, had to stop due to abnormal EKG and dyspnea with exertion. Pt has 325 aspirin PTA     Abnormal EKG    Mr. Vasques is a 78 y.o.  presents to [facility] complaining of abnormal EKG      History of Present Illness    ED 3/26/24: Patient is a 78-year-old male with history of CAD GERD presents to the ER with reports of abnormal EKG. Patient states that he was at the surgical center to have surgery on his right thigh. Patient was in preop when they noticed that he had an abnormal EKG and reported dyspnea on exertion. Patient did not have the surgery was sent to the ER for evaluation. Patient EKG reads atrial fibrillation with a rate of 73. He denies any chest pain at this time. Patient did receive 324 mg aspirin prior to ER arrival. No history of A-fib per family. He denies any shortness of breath currently but states that he does have shortness of breath with exertion. He has some mild lower extremity swelling. No abdominal pain nausea vomiting diarrhea. No fever or chills. Patient states he does not have a cardiologist.     Observation 3/26/24: Patient is an 78-year-old male presented to the hospital due to abnormal EKG found optometry office.  Patient was scheduled to have surgery at surgical center when EKG was obtained and patient reported to be in atrial fibrillation with dyspnea on exertion.  Patient denies chest pain, palpitations, dyspnea, nausea vomiting syncope or edema.  Patient states he does not have cardiac history.  Patient states mother colon  cancer in father passed away with cardiac disease.  Patient states he had stent placed about 16 years ago but no cardiology follow-up since that time.  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  Patient states he was scheduled to have ptosis repair of left eye today but not experiencing abnormal discharge or pain.  Patient states he does have some vision loss to left eye and able to see clearly when the lid open completely.    Review of Systems   Constitutional: Negative.   HENT: Negative.     Eyes:  Positive for vision loss in left eye and visual disturbance.   Cardiovascular: Negative.    Respiratory: Negative.     Endocrine: Negative.    Hematologic/Lymphatic: Negative.    Skin: Negative.    Musculoskeletal:  Positive for back pain.   Gastrointestinal: Negative.    Genitourinary: Negative.    Neurological: Negative.    Psychiatric/Behavioral: Negative.     Allergic/Immunologic: Negative.            Personal History     Past Medical History:   Past Medical History:   Diagnosis Date    Arthritis     Back pain     Blind right eye     CAD (coronary artery disease)     Esophagus disorder     several dilations    GERD (gastroesophageal reflux disease)     History of BPH     [place] (hard of hearing)     Lumbar stenosis with neurogenic claudication     Seasonal allergies        Surgical History:      Past Surgical History:   Procedure Laterality Date    BACK SURGERY      x3    CHOLECYSTECTOMY      COLONOSCOPY W/ POLYPECTOMY      CORONARY ANGIOPLASTY WITH STENT PLACEMENT      2013    ESOPHAGEAL DILATATION      EYE SURGERY      SPINAL FUSION      TOTAL HIP ARTHROPLASTY             Family History: family history is not on file. Otherwise pertinent FHx was reviewed and unremarkable.     Social History:  reports that he has never smoked. He does not have any smokeless tobacco history on file. He reports that he does not drink  alcohol and does not use drugs.      Medications:  Prior to Admission medications    Medication Sig Start Date End Date Taking? Authorizing Provider   HYDROcodone-acetaminophen (NORCO)  MG per tablet Take 1 tablet by mouth Every 6 (Six) Hours As Needed for Moderate Pain. 8/12/14  Yes Lalita Oleary MD   tamsulosin (FLOMAX) 0.4 MG capsule 24 hr capsule Take 2 capsules by mouth every night at bedtime.   Yes Lalita Oleary MD   diazePAM (VALIUM) 5 MG tablet 1 tablet. 1/10/24 3/26/24  Lalita Oleary MD   predniSONE (DELTASONE) 20 MG tablet Take 1 tablet by mouth Daily.  3/26/24  Lalita Oleary MD       Allergies:  No Known Allergies    Objective   Objective     Vital Signs  Temp:  [97.6 °F (36.4 °C)-98.4 °F (36.9 °C)] 97.6 °F (36.4 °C)  Heart Rate:  [52-78] 65  Resp:  [12-18] 16  BP: (130-168)/(68-87) 145/69  SpO2:  [95 %-100 %] 99 %  on   ;   Device (Oxygen Therapy): room air  Body mass index is 32.89 kg/m².    Physical Exam  Vitals and nursing note reviewed.   Constitutional:       Appearance: Normal appearance.   HENT:      Head: Normocephalic and atraumatic.      Right Ear: External ear normal.      Left Ear: External ear normal.      Nose: Nose normal.      Mouth/Throat:      Pharynx: Oropharynx is clear.   Eyes:      General: Visual field deficit present.      Pupils:      Right eye: Pupil is sluggish.      Left eye: Pupil is not reactive.   Cardiovascular:      Rate and Rhythm: Normal rate. Rhythm irregular.      Pulses: Normal pulses.      Heart sounds: Normal heart sounds.   Pulmonary:      Effort: Pulmonary effort is normal.      Breath sounds: Normal breath sounds.   Abdominal:      General: Bowel sounds are normal.      Palpations: Abdomen is soft.   Musculoskeletal:         General: Tenderness present.      Cervical back: Normal range of motion.   Skin:     General: Skin is warm.      Capillary Refill: Capillary refill takes less than 2 seconds.   Neurological:      Mental  Status: He is alert and oriented to person, place, and time.   Psychiatric:         Mood and Affect: Mood normal.         Behavior: Behavior normal.         Thought Content: Thought content normal.         Judgment: Judgment normal.           Results Review:  I have personally reviewed most recent cardiac tracings, lab results, and radiology images and interpretations and agree with findings, most notably: CBC, CMP, troponin, BNP,EKG, CXR .    Results from last 7 days   Lab Units 03/26/24  1051   WBC 10*3/mm3 8.58   HEMOGLOBIN g/dL 11.6*   HEMATOCRIT % 36.5*   PLATELETS 10*3/mm3 189     Results from last 7 days   Lab Units 03/26/24  1313 03/26/24  1051   SODIUM mmol/L  --  142   POTASSIUM mmol/L  --  4.0   CHLORIDE mmol/L  --  106   CO2 mmol/L  --  28.0   BUN mg/dL  --  19   CREATININE mg/dL  --  0.95   GLUCOSE mg/dL  --  108*   CALCIUM mg/dL  --  9.4   ALK PHOS U/L  --  43   ALT (SGPT) U/L  --  8   AST (SGOT) U/L  --  9   HSTROP T ng/L 35* 41*   PROBNP pg/mL  --  2,025.0*     Estimated Creatinine Clearance: 79.5 mL/min (by C-G formula based on SCr of 0.95 mg/dL).  Brief Urine Lab Results       None            Microbiology Results (last 10 days)       ** No results found for the last 240 hours. **            ECG/EMG Results (most recent)       Procedure Component Value Units Date/Time    ECG 12 Lead Chest Pain [[id number]] Collected: 03/26/24 1022     Updated: 03/26/24 1023     QT Interval 435 ms      QTC Interval 482 ms     Narrative:      HEART RATE= 73  bpm  RR Interval= 816  ms  OR Interval=   ms  P Horizontal Axis=   deg  P Front Axis=   deg  QRSD Interval= 106  ms  QT Interval= 435  ms  QTcB= 482  ms  QRS Axis= -24  deg  T Wave Axis= -6  deg  - ABNORMAL ECG -  Atrial fibrillation  Ventricular premature complex  Electronically Signed By:   Date and Time of Study: 2024-03-26 10:22:25    ECG 12 Lead [[id number]]  (Abnormal) Resulted: 03/26/24 1258     Updated: 03/26/24 1258                    XR Chest 1  View    Result Date: 3/26/2024  Impression: Somewhat limited exam. Stable cardiomegaly. No acute process. Electronically Signed: Saskia Fernandez MD  3/26/2024 11:11 AM EDT  Workstation ID: [id number]       Estimated Creatinine Clearance: 79.5 mL/min (by C-G formula based on SCr of 0.95 mg/dL).    Assessment & Plan   Assessment/Plan       Active Hospital Problems    Diagnosis  POA    **New onset a-fib [I48.91]  Yes      Resolved Hospital Problems   No resolved problems to display.     Chest pain  Lab Results   Component Value Date    TROPONINT 35 (H) 03/26/2024    TROPONINT 41 (H) 03/26/2024   -History of CAD s/p PCI (2013), endocarditis   -Start ASA   -Lipid panel, A1c and thyroid panel within normal limits  -Chest X-ray: Stable cardiomegaly with no acute process  -EKG on 3/22 showed atrial fibrillation with controlled rate and abnormal T wave, EKG today shows sinus rhythm with PVCs  -Stress Test and echocardiogram ordered   -Telemetry  -Cardiology consulted     History of ptosis and blindness  -Follows with [facility] ophthalmology and scheduled to have ptosis repair of left eye today but EKG last week revealed atrial fibrillation and again today upon exam   -History of retinal detachment to right eye  -Neurology consulted     Chronic pack pain/spinal stenosis  -Continue hydrocodone home regimen    Hypertension  -Poorly Controlled   BP Readings from Last 1 Encounters:   03/26/24 145/69   - Monitor while admitted  -Start lasix, monitor renal function     BPH  -Continue Flomax     GERD  -Continue PPI       VTE Prophylaxis -   Mechanical Order History:       None          Pharmalogical Order History:        Ordered     Dose Route Frequency Stop    03/26/24 1236  Enoxaparin Sodium (LOVENOX) syringe 40 mg         40 mg SC Daily --                    CODE STATUS:    Code Status and Medical Interventions:   Ordered at: 03/26/24 1115     Level Of Support Discussed With:    Patient     Code Status (Patient has no pulse  and is not breathing):    CPR (Attempt to Resuscitate)     Medical Interventions (Patient has pulse or is breathing):    Full Support       This patient has been examined wearing personal protective equipment.     I discussed the patient's findings and my recommendations with patient, family, nursing staff, primary care team, and consulting provider.      Signature:Electronically signed by CAMILLA Herron, 03/26/24, 3:03 PM EDT.      I spent 35 minutes caring for Rufino on this date of service. This time includes time spent by me in the following activities: reviewing tests, obtaining and/or reviewing a separately obtained history, performing a medically appropriate examination and/or evaluation, counseling and educating the patient/family/caregiver, ordering medications, tests, or procedures, referring and communicating with other health care professionals, documenting information in the medical record, independently interpreting results and communicating that information with the patient/family/caregiver, and care coordination.

## 2024-03-26 NOTE — PLAN OF CARE
Problem: Adult Inpatient Plan of Care  Goal: Plan of Care Review  Outcome: Ongoing, Progressing  Flowsheets (Taken 3/26/2024 1750)  Progress: improving  Plan of Care Reviewed With: patient  Outcome Evaluation:    Goal: Patient-Specific Goal (Individualized)  Outcome: Ongoing, Progressing  Goal: Absence of Hospital-Acquired Illness or Injury  Outcome: Ongoing, Progressing  Intervention: Identify and Manage Fall Risk  Recent Flowsheet Documentation  Taken 3/26/2024 1600 by Gail Romo RN  Safety Promotion/Fall Prevention: safety round/check completed  Taken 3/26/2024 1400 by Gail Romo RN  Safety Promotion/Fall Prevention:   safety round/check completed   room organization consistent   clutter free environment maintained   assistive device/personal items within reach  Intervention: Prevent and Manage VTE (Venous Thromboembolism) Risk  Recent Flowsheet Documentation  Taken 3/26/2024 1400 by Gail Romo RN  VTE Prevention/Management: (lovenox) other (see comments)  Intervention: Prevent Infection  Recent Flowsheet Documentation  Taken 3/26/2024 1400 by Gail Romo RN  Infection Prevention:   single patient room provided   environmental surveillance performed  Goal: Optimal Comfort and Wellbeing  Outcome: Ongoing, Progressing  Intervention: Provide Person-Centered Care  Recent Flowsheet Documentation  Taken 3/26/2024 1400 by Gail Romo RN  Trust Relationship/Rapport:   care explained   questions answered  Goal: Readiness for Transition of Care  Outcome: Ongoing, Progressing  Intervention: Mutually Develop Transition Plan  Recent Flowsheet Documentation  Taken 3/26/2024 1342 by Gail Romo RN  Transportation Anticipated: family or friend will provide  Patient/Family Anticipated Services at Transition: none  Patient/Family Anticipates Transition to: home with family  Taken 3/26/2024 1337 by Gail Romo RN  Equipment Currently Used at Home: cane, straight     Problem: Adult Inpatient Plan of Care  Goal: Plan of Care  Review  Outcome: Ongoing, Progressing  Flowsheets (Taken 3/26/2024 1750)  Progress: improving  Plan of Care Reviewed With: patient  Outcome Evaluation:    Goal: Patient-Specific Goal (Individualized)  Outcome: Ongoing, Progressing  Goal: Absence of Hospital-Acquired Illness or Injury  Outcome: Ongoing, Progressing  Intervention: Identify and Manage Fall Risk  Recent Flowsheet Documentation  Taken 3/26/2024 1600 by Gail Romo RN  Safety Promotion/Fall Prevention: safety round/check completed  Taken 3/26/2024 1400 by Gail Romo RN  Safety Promotion/Fall Prevention:   safety round/check completed   room organization consistent   clutter free environment maintained   assistive device/personal items within reach  Intervention: Prevent and Manage VTE (Venous Thromboembolism) Risk  Recent Flowsheet Documentation  Taken 3/26/2024 1400 by Gail Romo RN  VTE Prevention/Management: (lovenox) other (see comments)  Intervention: Prevent Infection  Recent Flowsheet Documentation  Taken 3/26/2024 1400 by Gail Romo RN  Infection Prevention:   single patient room provided   environmental surveillance performed  Goal: Optimal Comfort and Wellbeing  Outcome: Ongoing, Progressing  Intervention: Provide Person-Centered Care  Recent Flowsheet Documentation  Taken 3/26/2024 1400 by Gail Romo RN  Trust Relationship/Rapport:   care explained   questions answered  Goal: Readiness for Transition of Care  Outcome: Ongoing, Progressing  Intervention: Mutually Develop Transition Plan  Recent Flowsheet Documentation  Taken 3/26/2024 1342 by Gail Romo RN  Transportation Anticipated: family or friend will provide  Patient/Family Anticipated Services at Transition: none  Patient/Family Anticipates Transition to: home with family  Taken 3/26/2024 1337 by Gail Romo RN  Equipment Currently Used at Home: cane, straight   Goal Outcome Evaluation:  Plan of Care Reviewed With: patient        Progress: improving  Outcome Evaluation: Pt admitted  for new onset of AFIB. However, pt states anytime he has had an EKG done anywhere, it has shown AFIB, and no one does anything about. Cardiology consulted. Pt to have echo in stress in the AM; NPO at midnight. Cardiac monitoring placed, troponins 41 and 35. pt has been going in and out of AFIB this afternoon, heart rate normal. Pt has +2 pitting edema in ankles and feet; receiving IV lasix. Pt is on lovenox and is starting on baby aspirin. Pt is blind in right eye, ptosis of left eye. Pt's right hand is significantly purple/maroon, with scab on right hand as well from cutting his hand at home. Alert and oriented x4, IV in left AC saline locked, room air, up ad sammi, excellent appetite. Patient is very pleasant with no complaints of pain at this time.

## 2024-03-26 NOTE — PROGRESS NOTES
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  I am surprised that it has not been done so far    Another option would be to try Mestinon 30 to 60 mg every 8 and see how he does and follow-up in neuromuscular clinic anywhere    We can do blood work but sometimes you get seronegative myasthenia also and specially with his symptoms so I would rather have him see somebody electively and follow-up then just keep on going on trying these medications specially when we do not do nerve conduction and EMGs and he specifically needs repetitive stimulation EMGs if not everything else so I think my recommendation would be to have him follow-up with neuromuscular specialist as outpatient    Many specialists that do not clinically EMGs can do that or he can go to somebody with neuromuscular specialization    Thanks

## 2024-03-27 ENCOUNTER — APPOINTMENT (OUTPATIENT)
Dept: NUCLEAR MEDICINE | Facility: HOSPITAL | Age: 79
End: 2024-03-27
Payer: MEDICARE

## 2024-03-27 ENCOUNTER — APPOINTMENT (OUTPATIENT)
Dept: CARDIOLOGY | Facility: HOSPITAL | Age: 79
End: 2024-03-27
Payer: MEDICARE

## 2024-03-27 VITALS
HEIGHT: 72 IN | DIASTOLIC BLOOD PRESSURE: 75 MMHG | HEART RATE: 80 BPM | SYSTOLIC BLOOD PRESSURE: 125 MMHG | TEMPERATURE: 97.4 F | WEIGHT: 242.51 LBS | RESPIRATION RATE: 16 BRPM | OXYGEN SATURATION: 100 % | BODY MASS INDEX: 32.85 KG/M2

## 2024-03-27 LAB
ANION GAP SERPL CALCULATED.3IONS-SCNC: 8 MMOL/L (ref 5–15)
BASOPHILS # BLD AUTO: 0.03 10*3/MM3 (ref 0–0.2)
BASOPHILS NFR BLD AUTO: 0.5 % (ref 0–1.5)
BH CV ECHO LEFT VENTRICLE GLOBAL LONGITUDINAL STRAIN: -14 %
BH CV ECHO MEAS - ACS: 1.6 CM
BH CV ECHO MEAS - AO MAX PG: 7.8 MMHG
BH CV ECHO MEAS - AO MEAN PG: 4 MMHG
BH CV ECHO MEAS - AO V2 MAX: 140 CM/SEC
BH CV ECHO MEAS - AO V2 VTI: 22.9 CM
BH CV ECHO MEAS - AVA(I,D): 3.6 CM2
BH CV ECHO MEAS - EDV(CUBED): 132.7 ML
BH CV ECHO MEAS - EDV(MOD-SP4): 110 ML
BH CV ECHO MEAS - EF(MOD-BP): 52 %
BH CV ECHO MEAS - EF(MOD-SP4): 51.9 %
BH CV ECHO MEAS - ESV(CUBED): 39.3 ML
BH CV ECHO MEAS - ESV(MOD-SP4): 52.9 ML
BH CV ECHO MEAS - FS: 33.3 %
BH CV ECHO MEAS - IVS/LVPW: 1 CM
BH CV ECHO MEAS - IVSD: 1.1 CM
BH CV ECHO MEAS - LA DIMENSION: 5.1 CM
BH CV ECHO MEAS - LAT PEAK E' VEL: 13.9 CM/SEC
BH CV ECHO MEAS - LV DIASTOLIC VOL/BSA (35-75): 47.6 CM2
BH CV ECHO MEAS - LV MASS(C)D: 213.9 GRAMS
BH CV ECHO MEAS - LV MAX PG: 4.9 MMHG
BH CV ECHO MEAS - LV MEAN PG: 2 MMHG
BH CV ECHO MEAS - LV SYSTOLIC VOL/BSA (12-30): 22.9 CM2
BH CV ECHO MEAS - LV V1 MAX: 111 CM/SEC
BH CV ECHO MEAS - LV V1 VTI: 21.9 CM
BH CV ECHO MEAS - LVIDD: 5.1 CM
BH CV ECHO MEAS - LVIDS: 3.4 CM
BH CV ECHO MEAS - LVOT AREA: 3.8 CM2
BH CV ECHO MEAS - LVOT DIAM: 2.2 CM
BH CV ECHO MEAS - LVPWD: 1.1 CM
BH CV ECHO MEAS - MED PEAK E' VEL: 8.9 CM/SEC
BH CV ECHO MEAS - MV A MAX VEL: 22.5 CM/SEC
BH CV ECHO MEAS - MV DEC SLOPE: 381 CM/SEC2
BH CV ECHO MEAS - MV DEC TIME: 0.19 SEC
BH CV ECHO MEAS - MV E MAX VEL: 81.2 CM/SEC
BH CV ECHO MEAS - MV E/A: 3.6
BH CV ECHO MEAS - MV MAX PG: 2.8 MMHG
BH CV ECHO MEAS - MV MEAN PG: 1 MMHG
BH CV ECHO MEAS - MV P1/2T: 68.9 MSEC
BH CV ECHO MEAS - MV V2 VTI: 17.5 CM
BH CV ECHO MEAS - MVA(P1/2T): 3.2 CM2
BH CV ECHO MEAS - MVA(VTI): 4.8 CM2
BH CV ECHO MEAS - PA ACC TIME: 0.12 SEC
BH CV ECHO MEAS - PA V2 MAX: 132 CM/SEC
BH CV ECHO MEAS - RAP SYSTOLE: 3 MMHG
BH CV ECHO MEAS - RV MAX PG: 1.98 MMHG
BH CV ECHO MEAS - RV V1 MAX: 70.3 CM/SEC
BH CV ECHO MEAS - RV V1 VTI: 14.1 CM
BH CV ECHO MEAS - RVDD: 3.9 CM
BH CV ECHO MEAS - RVSP: 21 MMHG
BH CV ECHO MEAS - SI(MOD-SP4): 24.7 ML/M2
BH CV ECHO MEAS - SV(LVOT): 83.2 ML
BH CV ECHO MEAS - SV(MOD-SP4): 57.1 ML
BH CV ECHO MEAS - TAPSE (>1.6): 2.14 CM
BH CV ECHO MEAS - TR MAX PG: 18 MMHG
BH CV ECHO MEAS - TR MAX VEL: 212 CM/SEC
BH CV ECHO MEASUREMENTS AVERAGE E/E' RATIO: 7.12
BH CV NUCLEAR PRIOR STUDY: 3
BH CV REST NUCLEAR ISOTOPE DOSE: 11 MCI
BH CV STRESS BP STAGE 1: NORMAL
BH CV STRESS BP STAGE 2: NORMAL
BH CV STRESS COMMENTS STAGE 1: NORMAL
BH CV STRESS COMMENTS STAGE 2: NORMAL
BH CV STRESS DOSE REGADENOSON STAGE 1: 0.4
BH CV STRESS DURATION MIN STAGE 1: 0
BH CV STRESS DURATION MIN STAGE 2: 4
BH CV STRESS DURATION SEC STAGE 1: 10
BH CV STRESS DURATION SEC STAGE 2: 0
BH CV STRESS HR STAGE 1: 90
BH CV STRESS HR STAGE 2: 95
BH CV STRESS NUCLEAR ISOTOPE DOSE: 32.7 MCI
BH CV STRESS PROTOCOL 1: NORMAL
BH CV STRESS RECOVERY BP: NORMAL MMHG
BH CV STRESS RECOVERY HR: 87 BPM
BH CV STRESS STAGE 1: 1
BH CV STRESS STAGE 2: 2
BH CV XLRA - TDI S': 18.3 CM/SEC
BUN SERPL-MCNC: 23 MG/DL (ref 8–23)
BUN/CREAT SERPL: 24 (ref 7–25)
CALCIUM SPEC-SCNC: 8.9 MG/DL (ref 8.6–10.5)
CHLORIDE SERPL-SCNC: 104 MMOL/L (ref 98–107)
CO2 SERPL-SCNC: 31 MMOL/L (ref 22–29)
CREAT SERPL-MCNC: 0.96 MG/DL (ref 0.76–1.27)
DEPRECATED RDW RBC AUTO: 43.8 FL (ref 37–54)
EGFRCR SERPLBLD CKD-EPI 2021: 79.9 ML/MIN/1.73
EOSINOPHIL # BLD AUTO: 0.09 10*3/MM3 (ref 0–0.4)
EOSINOPHIL NFR BLD AUTO: 1.5 % (ref 0.3–6.2)
ERYTHROCYTE [DISTWIDTH] IN BLOOD BY AUTOMATED COUNT: 12.7 % (ref 12.3–15.4)
GLUCOSE SERPL-MCNC: 76 MG/DL (ref 65–99)
HCT VFR BLD AUTO: 36.8 % (ref 37.5–51)
HGB BLD-MCNC: 11.9 G/DL (ref 13–17.7)
IMM GRANULOCYTES # BLD AUTO: 0.02 10*3/MM3 (ref 0–0.05)
IMM GRANULOCYTES NFR BLD AUTO: 0.3 % (ref 0–0.5)
LEFT ATRIUM VOLUME INDEX: 37 ML/M2
LV EF NUC BP: 65 %
LYMPHOCYTES # BLD AUTO: 2.31 10*3/MM3 (ref 0.7–3.1)
LYMPHOCYTES NFR BLD AUTO: 37.9 % (ref 19.6–45.3)
MAXIMAL PREDICTED HEART RATE: 140 BPM
MCH RBC QN AUTO: 30.6 PG (ref 26.6–33)
MCHC RBC AUTO-ENTMCNC: 32.3 G/DL (ref 31.5–35.7)
MCV RBC AUTO: 94.6 FL (ref 79–97)
MONOCYTES # BLD AUTO: 0.5 10*3/MM3 (ref 0.1–0.9)
MONOCYTES NFR BLD AUTO: 8.2 % (ref 5–12)
NEUTROPHILS NFR BLD AUTO: 3.14 10*3/MM3 (ref 1.7–7)
NEUTROPHILS NFR BLD AUTO: 51.6 % (ref 42.7–76)
NRBC BLD AUTO-RTO: 0 /100 WBC (ref 0–0.2)
PERCENT MAX PREDICTED HR: 67.86 %
PLATELET # BLD AUTO: 167 10*3/MM3 (ref 140–450)
PMV BLD AUTO: 9.9 FL (ref 6–12)
POTASSIUM SERPL-SCNC: 3.8 MMOL/L (ref 3.5–5.2)
QT INTERVAL: 435 MS
QTC INTERVAL: 482 MS
RBC # BLD AUTO: 3.89 10*6/MM3 (ref 4.14–5.8)
SINUS: 3.4 CM
SODIUM SERPL-SCNC: 143 MMOL/L (ref 136–145)
STJ: 2.9 CM
STRESS BASELINE BP: NORMAL MMHG
STRESS BASELINE HR: 84 BPM
STRESS PERCENT HR: 80 %
STRESS POST PEAK BP: NORMAL MMHG
STRESS POST PEAK HR: 95 BPM
STRESS TARGET HR: 119 BPM
WBC NRBC COR # BLD AUTO: 6.09 10*3/MM3 (ref 3.4–10.8)

## 2024-03-27 PROCEDURE — 99214 OFFICE O/P EST MOD 30 MIN: CPT | Performed by: INTERNAL MEDICINE

## 2024-03-27 PROCEDURE — 93306 TTE W/DOPPLER COMPLETE: CPT

## 2024-03-27 PROCEDURE — 93306 TTE W/DOPPLER COMPLETE: CPT | Performed by: INTERNAL MEDICINE

## 2024-03-27 PROCEDURE — A9502 TC99M TETROFOSMIN: HCPCS | Performed by: EMERGENCY MEDICINE

## 2024-03-27 PROCEDURE — 78452 HT MUSCLE IMAGE SPECT MULT: CPT

## 2024-03-27 PROCEDURE — 93018 CV STRESS TEST I&R ONLY: CPT | Performed by: INTERNAL MEDICINE

## 2024-03-27 PROCEDURE — 25010000002 FUROSEMIDE PER 20 MG: Performed by: NURSE PRACTITIONER

## 2024-03-27 PROCEDURE — 80048 BASIC METABOLIC PNL TOTAL CA: CPT | Performed by: NURSE PRACTITIONER

## 2024-03-27 PROCEDURE — 93356 MYOCRD STRAIN IMG SPCKL TRCK: CPT | Performed by: INTERNAL MEDICINE

## 2024-03-27 PROCEDURE — 93356 MYOCRD STRAIN IMG SPCKL TRCK: CPT

## 2024-03-27 PROCEDURE — G0378 HOSPITAL OBSERVATION PER HR: HCPCS

## 2024-03-27 PROCEDURE — 96375 TX/PRO/DX INJ NEW DRUG ADDON: CPT

## 2024-03-27 PROCEDURE — 96376 TX/PRO/DX INJ SAME DRUG ADON: CPT

## 2024-03-27 PROCEDURE — 0 TECHNETIUM TETROFOSMIN KIT: Performed by: EMERGENCY MEDICINE

## 2024-03-27 PROCEDURE — 85025 COMPLETE CBC W/AUTO DIFF WBC: CPT | Performed by: NURSE PRACTITIONER

## 2024-03-27 PROCEDURE — 25010000002 REGADENOSON 0.4 MG/5ML SOLUTION: Performed by: EMERGENCY MEDICINE

## 2024-03-27 PROCEDURE — 93017 CV STRESS TEST TRACING ONLY: CPT

## 2024-03-27 PROCEDURE — 78452 HT MUSCLE IMAGE SPECT MULT: CPT | Performed by: INTERNAL MEDICINE

## 2024-03-27 RX ORDER — REGADENOSON 0.08 MG/ML
0.4 INJECTION, SOLUTION INTRAVENOUS
Status: COMPLETED | OUTPATIENT
Start: 2024-03-27 | End: 2024-03-27

## 2024-03-27 RX ORDER — APIXABAN 5 MG (74)
5 KIT ORAL TAKE AS DIRECTED
Qty: 74 TABLET | Refills: 0 | Status: SHIPPED | OUTPATIENT
Start: 2024-03-27

## 2024-03-27 RX ORDER — ASPIRIN 81 MG/1
81 TABLET ORAL DAILY
Qty: 30 TABLET | Refills: 0 | Status: SHIPPED | OUTPATIENT
Start: 2024-03-28 | End: 2024-04-27

## 2024-03-27 RX ORDER — METOPROLOL SUCCINATE 25 MG/1
25 TABLET, EXTENDED RELEASE ORAL DAILY
Qty: 30 TABLET | Refills: 0 | Status: SHIPPED | OUTPATIENT
Start: 2024-03-27 | End: 2024-04-26

## 2024-03-27 RX ORDER — ATORVASTATIN CALCIUM 10 MG/1
20 TABLET, FILM COATED ORAL DAILY
Qty: 90 TABLET | Refills: 0 | Status: SHIPPED | OUTPATIENT
Start: 2024-03-27

## 2024-03-27 RX ADMIN — Medication 10 ML: at 09:37

## 2024-03-27 RX ADMIN — ASPIRIN 81 MG: 81 TABLET, COATED ORAL at 09:37

## 2024-03-27 RX ADMIN — REGADENOSON 0.4 MG: 0.08 INJECTION, SOLUTION INTRAVENOUS at 10:20

## 2024-03-27 RX ADMIN — TETROFOSMIN 1 DOSE: 1.38 INJECTION, POWDER, LYOPHILIZED, FOR SOLUTION INTRAVENOUS at 09:07

## 2024-03-27 RX ADMIN — TETROFOSMIN 1 DOSE: 1.38 INJECTION, POWDER, LYOPHILIZED, FOR SOLUTION INTRAVENOUS at 10:20

## 2024-03-27 RX ADMIN — FUROSEMIDE 40 MG: 10 INJECTION, SOLUTION INTRAMUSCULAR; INTRAVENOUS at 06:22

## 2024-03-27 RX ADMIN — ACETAMINOPHEN 650 MG: 325 TABLET, FILM COATED ORAL at 12:17

## 2024-03-27 RX ADMIN — PANTOPRAZOLE SODIUM 40 MG: 40 INJECTION, POWDER, FOR SOLUTION INTRAVENOUS at 06:22

## 2024-03-27 NOTE — PLAN OF CARE
Goal Outcome Evaluation:  Plan of Care Reviewed With: patient        Progress: improving  Outcome Evaluation: Pt slept throughout the night with no complaints. Pt NPO at midnight for Echo and Stress test in the AM. Heart rate normal this far through shift, if not more on the amelia side.

## 2024-03-27 NOTE — CASE MANAGEMENT/SOCIAL WORK
Continued Stay Note  [facility]     Patient Name: Rufino Vasques  MRN: [id number]  Today's Date: 3/27/2024    Admit Date: 3/26/2024    Plan: Need CM assessment   Discharge Plan       Row Name 03/27/24 1145       Plan    Plan Need CM assessment    Plan Comments Patient OOR during CM rounds. Will follow-up.                   Discharge Codes    No documentation.                 Expected Discharge Date and Time       Expected Discharge Date Expected Discharge Time    Mar 28, 2024               Hemant Bach RN

## 2024-03-27 NOTE — PROGRESS NOTES
Cardiology Progress Note    Patient Identification:  Name: Rufino Vasques  Age: 78 y.o.  Sex: male  :  1945  MRN: [id number]                 Follow Up / Chief Complaint: A-fib  Chief Complaint   Patient presents with    Chest Pain     Pt was scheduled for eye surgery and was being prepped for the surgery today, had to stop due to abnormal EKG and dyspnea with exertion. Pt has 325 aspirin PTA       Interval History: Patient was found to have A-fib for preop evaluation for eye surgery.  Gave history of chest pain.  Reportedly had previous history of stents and endocarditis.       Subjective: Patient seen and examined.  Chart reviewed.  Labs reviewed.  Discussed with RN taking care of patient.        History of present illness:      Mr. Rufino Vasques has a PMH of      - CAD status post PCI ( details not available)   - history of Endocarditis (treated with 6 weeks IV abx)  - GERD/previous esophageal dilations  - Chronic back pain secondary to spinal stenosis ([facility] [place])  - Back surgeries x 4, right eye surgery x 5, hip replacement, cholecystectomy  - Blindness in right eye, ptosis left eye, hard of hearing  - Family history of father with valvular disease.  - No known allergies  - Non-smoker        Presented to the ED this morning from surgical center with abnormal EKG.  Patient was being prepped for left eye surgery due to ptosis.  His surgery was canceled and he was transferred to the emergency room as his EKG showed A-fib.  Patient denies any history of A-fib.  He denies any current chest pain or shortness of breath.  However he reports that he gets tired quicker and he had an aching in his chest after he had worked in his yard.         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  Patient's troponin was 41, BNP was mildly elevated.  Echo revealed normal LV  Patient has history of CAD PCI in the past and not followed up with cardiology or takes any medication given aspirin.  Counseled on importance of compliance.  Will start him on aspirin and statins and beta-blockers as tolerated.  Patient has high [id number] score will benefit from long-term anticoagulation to prevent thromboembolic events.  Will follow-up and start the same.  TSH is normal at 0.515 and FT4 is normal.  Patient is preop for left eye surgery for ptosis.  Will consult neurology to see the etiology of his ptosis.  Patient underwent echocardiogram 3/27/2024 which revealed normal LV systolic function, severe biatrial enlargement consistent with chronic atrial fibrillation.  Patient advised Lexiscan Cardiolite 3/27/2024 which was negative for ischemia.  Will treat him medically with Eliquis 5 twice daily, aspirin, beta-blockers, statins as tolerated.  Will follow-up closely as outpatient in 2 weeks.  Discussed with Sesar Corley nurse practitioner.  Discussed with patient and his wife at bedside         Copied text in this portion of the note has been reviewed and is accurate as of 3/27/2024    Past Medical History:  Past Medical History:   Diagnosis Date    Arthritis     Back pain     Blind right eye     CAD (coronary artery disease)     Esophagus disorder     several dilations    GERD (gastroesophageal reflux disease)     History of BPH     [place] (hard of hearing)     Lumbar stenosis with neurogenic  "claudication     Seasonal allergies      Past Surgical History:  Past Surgical History:   Procedure Laterality Date    BACK SURGERY      x3    CHOLECYSTECTOMY      COLONOSCOPY W/ POLYPECTOMY      CORONARY ANGIOPLASTY WITH STENT PLACEMENT      2013    ESOPHAGEAL DILATATION      EYE SURGERY      SPINAL FUSION      TOTAL HIP ARTHROPLASTY          Social History:   Social History     Tobacco Use    Smoking status: Never    Smokeless tobacco: Not on file   Substance Use Topics    Alcohol use: Never      Family History:  Family History   Problem Relation Age of Onset    Malig Hyperthermia Neg Hx           Allergies:  No Known Allergies  Scheduled Meds:  aspirin, 81 mg, Daily  enoxaparin, 40 mg, Daily  furosemide, 40 mg, Q12H  pantoprazole, 40 mg, Q AM  sodium chloride, 10 mL, Q12H  tamsulosin, 0.8 mg, Daily          Review of Systems:   ROS  Review of Systems   Constitution: Negative for chills and fever.   Cardiovascular: Negative for chest pain and palpitations.   Respiratory: Negative for cough and hemoptysis.    Gastrointestinal: Negative for nausea.        Constitutional:  Temp:  [97.4 °F (36.3 °C)-98.4 °F (36.9 °C)] 97.4 °F (36.3 °C)  Heart Rate:  [60-80] 80  Resp:  [14-19] 16  BP: (125-160)/(69-85) 125/75    Physical Exam   /75 (BP Location: Right arm, Patient Position: Lying)   Pulse 80   Temp 97.4 °F (36.3 °C) (Oral)   Resp 16   Ht 182.9 cm (72\")   Wt 110 kg (242 lb 8.1 oz)   SpO2 100%   BMI 32.89 kg/m²   General:  Appears in no acute distress  Eyes: Sclera is anicteric,  conjunctiva is clear   HEENT:  No JVD. Thyroid not visibly enlarged. No mucosal pallor or cyanosis  Respiratory: Respirations regular and unlabored at rest.  Clear to auscultation  Cardiovascular: S1,S2, irregularly irregular rate  Gastrointestinal: Abdomen nondistended.  Musculoskeletal:  No abnormal movements  Extremities: No digital clubbing or cyanosis  Skin: Color pink.   Neuro: Alert and awake.    INTAKE AND " OUTPUT:    Intake/Output Summary (Last 24 hours) at 3/27/2024 1430  Last data filed at 3/27/2024 0451  Gross per 24 hour   Intake --   Output 2400 ml   Net -2400 ml       Cardiographics  Telemetry: Atrial fibrillation with controlled ventricular response    ECG:   ECG 12 Lead   ED Interpretation   Abnormal   Beti Monaco PA     3/26/2024 12:58 PM   ECG 12 Lead         Date/Time: 3/26/2024 10:53 AM      Performed by: Beti Monaco PA   Authorized by: Logan Caruso MD  Interpreted by ED physician   Previous ECG: no previous ECG available   Rhythm: atrial fibrillation   Rate: normal   BPM: 87   QRS axis: normal   Conduction: conduction normal   Clinical impression: abnormal ECG      ECG 12 Lead Chest Pain   Final Result   HEART RATE= 73  bpm   RR Interval= 816  ms   NE Interval=   ms   P Horizontal Axis=   deg   P Front Axis=   deg   QRSD Interval= 106  ms   QT Interval= 435  ms   QTcB= 482  ms   QRS Axis= -24  deg   T Wave Axis= -6  deg   - ABNORMAL ECG -   Atrial fibrillation   Ventricular premature complex   When compared with ECG of 22-Mar-2024 12:07:28,   No significant change   Electronically Signed By: Logan Caruso (LIZA) 27-Mar-2024 06:12:01   Date and Time of Study: 2024-03-26 10:22:25        I have personally reviewed EKG    Echocardiogram: Results for orders placed during the hospital encounter of 03/26/24    Adult Transthoracic Echo Complete W/ Cont if Necessary Per Protocol    Interpretation Summary    Left ventricular systolic function is normal. Calculated left ventricular EF = 52% Left ventricular ejection fraction appears to be 51 - 55%.    Left ventricular diastolic dysfunction is noted.    The right ventricular cavity is mildly dilated.    The left atrial cavity is severely dilated.    The right atrial cavity is severely  dilated.    Estimated right ventricular systolic pressure from tricuspid regurgitation is normal (<35 mmHg).    Conclusion      Normal LV size and contractility EF of 50  "to 65%  Mild right ventricular enlargement seen.  Severe left atrial enlargement seen.  Severe right atrial enlargement seen.  Pulmonic valve is not well visualized.  Aortic valve, mitral valve, tricuspid valve appears structurally normal, trace mitral and tricuspid regurgitation seen.  Calculated RV systolic pressure 21 mmHg.  No pericardial effusion seen.  Proximal aorta appears normal in size.      Lab Review   I have reviewed the labs  Results from last 7 days   Lab Units 03/26/24  1313 03/26/24  1051   HSTROP T ng/L 35* 41*     Results from last 7 days   Lab Units 03/26/24  1313   MAGNESIUM mg/dL 2.0     Results from last 7 days   Lab Units 03/27/24  0436   SODIUM mmol/L 143   POTASSIUM mmol/L 3.8   BUN mg/dL 23   CREATININE mg/dL 0.96   CALCIUM mg/dL 8.9         Results from last 7 days   Lab Units 03/27/24  0436 03/26/24  1051   WBC 10*3/mm3 6.09 8.58   HEMOGLOBIN g/dL 11.9* 11.6*   HEMATOCRIT % 36.8* 36.5*   PLATELETS 10*3/mm3 167 189           RADIOLOGY:  Imaging Results (Last 24 Hours)       ** No results found for the last 24 hours. **                  )3/27/2024  Pieter Aguilar MD      EMR Dragon/Transcription:   \"Dictated utilizing Dragon dictation\".   "

## 2024-03-27 NOTE — CASE MANAGEMENT/SOCIAL WORK
Discharge Planning Assessment  [facility]     Patient Name: Rufino Vasques  MRN: [id number]  Today's Date: 3/27/2024    Admit Date: 3/26/2024    Plan: Routine home   Discharge Needs Assessment       Row Name 03/27/24 1312       Living Environment    People in Home spouse    Name(s) of People in Home wife Amy    Current Living Arrangements home    Potentially Unsafe Housing Conditions none    In the past 12 months has the electric, gas, oil, or water company threatened to shut off services in your home? No    Primary Care Provided by self    Provides Primary Care For no one    Family Caregiver if Needed spouse    Family Caregiver Names Amy    Quality of Family Relationships helpful;involved;supportive    Able to Return to Prior Arrangements yes       Resource/Environmental Concerns    Resource/Environmental Concerns none    Transportation Concerns none       Transportation Needs    In the past 12 months, has lack of transportation kept you from medical appointments or from getting medications? no    In the past 12 months, has lack of transportation kept you from meetings, work, or from getting things needed for daily living? No       Food Insecurity    Within the past 12 months, you worried that your food would run out before you got the money to buy more. Never true    Within the past 12 months, the food you bought just didn't last and you didn't have money to get more. Never true       Transition Planning    Patient/Family Anticipates Transition to home with family    Patient/Family Anticipated Services at Transition none    Transportation Anticipated car, drives self;family or friend will provide       Discharge Needs Assessment    Readmission Within the Last 30 Days no previous admission in last 30 days    Equipment Currently Used at Home walker, rolling;cane, straight    Concerns to be Addressed denies needs/concerns at this time    Anticipated Changes Related to Illness none    Equipment Needed After Discharge  none                   Discharge Plan       Row Name 03/27/24 1313       Plan    Plan Routine home    Plan Comments CM met with patient at the bedside. Confirmed PCP, insurance, and pharmacy. Patient denies any difficulty affording medications. Patient is not current with any HHC/OPPT/OT services. Patient lives at home with wife, is IADLS, and drives. Wife Amy will provide DC transport. DC Barriers: Cardiology and Neurology following, IV lasix, myoview results pending.                Demographic Summary       Row Name 03/27/24 1312       General Information    Admission Type observation    Arrived From emergency department    Required Notices Provided Observation Status Notice    Referral Source admission list    Reason for Consult discharge planning    Preferred Language English       Contact Information    Permission Granted to Share Info With     Contact Information Obtained for                    Functional Status       Row Name 03/27/24 1312       Functional Status    Usual Activity Tolerance good    Current Activity Tolerance moderate       Functional Status, IADL    Medications independent    Meal Preparation independent    Housekeeping independent    Laundry independent    Shopping independent       Mental Status    General Appearance WDL WDL       Mental Status Summary    Recent Changes in Mental Status/Cognitive Functioning no changes           Hemant Bach RN     Cell number [phone]  Office number [phone]

## 2024-03-27 NOTE — CASE MANAGEMENT/SOCIAL WORK
Case Management Discharge Note      Final Note: Routine home         Selected Continued Care - Discharged on 3/27/2024 Admission date: 3/26/2024 - Discharge disposition: Home or Self Care         Transportation Services  Private: Car    Final Discharge Disposition Code: 01 - home or self-care

## 2024-03-27 NOTE — DISCHARGE SUMMARY
[facility]    Sybil Fleming MD    CHIEF COMPLAINT:     New onset A-fib    HISTORY OF PRESENT ILLNESS:    Mr. Vasques is a 78 y.o.  presents to [facility] complaining of abnormal EKG        History of Present Illness     ED 3/26/24: Patient is a 78-year-old male with history of CAD GERD presents to the ER with reports of abnormal EKG. Patient states that he was at the surgical center to have surgery on his right thigh. Patient was in preop when they noticed that he had an abnormal EKG and reported dyspnea on exertion. Patient did not have the surgery was sent to the ER for evaluation. Patient EKG reads atrial fibrillation with a rate of 73. He denies any chest pain at this time. Patient did receive 324 mg aspirin prior to ER arrival. No history of A-fib per family. He denies any shortness of breath currently but states that he does have shortness of breath with exertion. He has some mild lower extremity swelling. No abdominal pain nausea vomiting diarrhea. No fever or chills. Patient states he does not have a cardiologist.      Observation 3/26/24: Patient is an 78-year-old male presented to the hospital due to abnormal EKG found optometry office.  Patient was scheduled to have surgery at surgical center when EKG was obtained and patient reported to be in atrial fibrillation with dyspnea on exertion.  Patient denies chest pain, palpitations, dyspnea, nausea vomiting syncope or edema.  Patient states he does not have cardiac history.  Patient states mother colon cancer in father passed away with cardiac disease.  Patient states he had stent placed about 16 years ago but no cardiology follow-up since that time.  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  Patient states he was scheduled to have ptosis repair of left eye today but not experiencing abnormal discharge or pain.  Patient  states he does have some vision loss to left eye and able to see clearly when the lid open completely.     3/27/2024: Patient reports he did generally well overnight without new or acute symptoms reported.          Past Medical History:   Diagnosis Date    Arthritis     Back pain     Blind right eye     CAD (coronary artery disease)     Esophagus disorder     several dilations    GERD (gastroesophageal reflux disease)     History of BPH     [place] (hard of hearing)     Lumbar stenosis with neurogenic claudication     Seasonal allergies      Past Surgical History:   Procedure Laterality Date    BACK SURGERY      x3    CHOLECYSTECTOMY      COLONOSCOPY W/ POLYPECTOMY      CORONARY ANGIOPLASTY WITH STENT PLACEMENT      2013    ESOPHAGEAL DILATATION      EYE SURGERY      SPINAL FUSION      TOTAL HIP ARTHROPLASTY       Family History   Problem Relation Age of Onset    [place] Hyperthermia Neg Hx      Social History     Tobacco Use    Smoking status: Never   Vaping Use    Vaping status: Never Used   Substance Use Topics    Alcohol use: Never    Drug use: Never     No medications prior to admission.     Allergies:  Patient has no known allergies.      There is no immunization history on file for this patient.        REVIEW OF SYSTEMS:    Review of Systems   Constitutional: Negative.   HENT: Negative.     Eyes:  Positive for vision loss in left eye and visual disturbance.   Cardiovascular: Negative.    Respiratory: Negative.     Endocrine: Negative.    Hematologic/Lymphatic: Negative.    Skin: Negative.    Musculoskeletal:  Positive for back pain.   Gastrointestinal: Negative.    Genitourinary: Negative.    Neurological: Negative.    Psychiatric/Behavioral: Negative.     Allergic/Immunologic: Negative.      Vital Signs  Temp:  [97.4 °F (36.3 °C)-98.4 °F (36.9 °C)] 97.4 °F (36.3 °C)  Heart Rate:  [60-80] 80  Resp:  [14-19] 16  BP: (125-160)/(71-85) 125/75          Physical Exam:  Physical Exam  Vitals reviewed.   Constitutional:        General: He is not in acute distress.     Appearance: Normal appearance. He is obese. He is not ill-appearing, toxic-appearing or diaphoretic.   HENT:      Head: Normocephalic.      Right Ear: External ear normal.      Left Ear: External ear normal.      Nose: Nose normal.      Mouth/Throat:      Mouth: Mucous membranes are moist.   Eyes:      Comments: Right eye blindness noted   Cardiovascular:      Rate and Rhythm: Normal rate. Rhythm irregular.      Pulses: Normal pulses.      Heart sounds: Normal heart sounds.   Pulmonary:      Effort: Pulmonary effort is normal.      Breath sounds: Normal breath sounds.   Abdominal:      General: Bowel sounds are normal.      Palpations: Abdomen is soft.      Tenderness: There is no abdominal tenderness.   Musculoskeletal:         General: Normal range of motion.      Cervical back: Normal range of motion.      Right lower leg: No edema.      Left lower leg: No edema.   Skin:     General: Skin is warm and dry.      Capillary Refill: Capillary refill takes less than 2 seconds.   Neurological:      General: No focal deficit present.      Mental Status: He is alert and oriented to person, place, and time.   Psychiatric:         Mood and Affect: Mood normal.         Behavior: Behavior normal.         Thought Content: Thought content normal.         Judgment: Judgment normal.       Emotional Behavior:   Normal   Debilities:  None  Results Review:    I reviewed the patient's new clinical results.  Lab Results (most recent)       Procedure Component Value Units Date/Time    Basic Metabolic Panel [[id number]]  (Abnormal) Collected: 03/27/24 0436    Specimen: Blood from Arm, Right Updated: 03/27/24 0539     Glucose 76 mg/dL      BUN 23 mg/dL      Creatinine 0.96 mg/dL      Sodium 143 mmol/L      Potassium 3.8 mmol/L      Chloride 104 mmol/L      CO2 31.0 mmol/L      Calcium 8.9 mg/dL      BUN/Creatinine Ratio 24.0     Anion Gap 8.0 mmol/L      eGFR 79.9 mL/min/1.73     Narrative:       GFR Normal >60  Chronic Kidney Disease <60  Kidney Failure <15    The GFR formula is only valid for adults with stable renal function between ages 18 and 70.    CBC & Differential [[id number]]  (Abnormal) Collected: 03/27/24 0436    Specimen: Blood from Arm, Right Updated: 03/27/24 0519    Narrative:      The following orders were created for panel order CBC & Differential.  Procedure                               Abnormality         Status                     ---------                               -----------         ------                     CBC Auto Differential[[id number]]        Abnormal            Final result                 Please view results for these tests on the individual orders.    CBC Auto Differential [[id number]]  (Abnormal) Collected: 03/27/24 0436    Specimen: Blood from Arm, Right Updated: 03/27/24 0519     WBC 6.09 10*3/mm3      RBC 3.89 10*6/mm3      Hemoglobin 11.9 g/dL      Hematocrit 36.8 %      MCV 94.6 fL      MCH 30.6 pg      MCHC 32.3 g/dL      RDW 12.7 %      RDW-SD 43.8 fl      MPV 9.9 fL      Platelets 167 10*3/mm3      Neutrophil % 51.6 %      Lymphocyte % 37.9 %      Monocyte % 8.2 %      Eosinophil % 1.5 %      Basophil % 0.5 %      Immature Grans % 0.3 %      Neutrophils, Absolute 3.14 10*3/mm3      Lymphocytes, Absolute 2.31 10*3/mm3      Monocytes, Absolute 0.50 10*3/mm3      Eosinophils, Absolute 0.09 10*3/mm3      Basophils, Absolute 0.03 10*3/mm3      Immature Grans, Absolute 0.02 10*3/mm3      nRBC 0.0 /100 WBC     Hemoglobin A1c [[id number]]  (Normal) Collected: 03/26/24 1051    Specimen: Blood Updated: 03/26/24 1359     Hemoglobin A1C 5.20 %     High Sensitivity Troponin T 2Hr [[id number]]  (Abnormal) Collected: 03/26/24 1313    Specimen: Blood from Arm, Left Updated: 03/26/24 1359     HS Troponin T 35 ng/L      Troponin T Delta -6 ng/L     Narrative:      High Sensitive Troponin T Reference Range:  <14.0 ng/L- Negative Female for AMI  <22.0 ng/L- Negative Male for  AMI  >=14 - Abnormal Female indicating possible myocardial injury.  >=22 - Abnormal Male indicating possible myocardial injury.   Clinicians would have to utilize clinical acumen, EKG, Troponin, and serial changes to determine if it is an Acute Myocardial Infarction or myocardial injury due to an underlying chronic condition.         Lipid Panel [[id number]] Collected: 03/26/24 1313    Specimen: Blood from Arm, Left Updated: 03/26/24 1359     Total Cholesterol 157 mg/dL      Triglycerides 83 mg/dL      HDL Cholesterol 56 mg/dL      LDL Cholesterol  85 mg/dL      VLDL Cholesterol 16 mg/dL      LDL/HDL Ratio 1.51    Narrative:      Cholesterol Reference Ranges  (U.S. Department of Health and Human Services ATP III Classifications)    Desirable          <200 mg/dL  Borderline High    200-239 mg/dL  High Risk          >240 mg/dL      Triglyceride Reference Ranges  (U.S. Department of Health and Human Services ATP III Classifications)    Normal           <150 mg/dL  Borderline High  150-199 mg/dL  High             200-499 mg/dL  Very High        >500 mg/dL    HDL Reference Ranges  (U.S. Department of Health and Human Services ATP III Classifications)    Low     <40 mg/dl (major risk factor for CHD)  High    >60 mg/dl ('negative' risk factor for CHD)        LDL Reference Ranges  (U.S. Department of Health and Human Services ATP III Classifications)    Optimal          <100 mg/dL  Near Optimal     100-129 mg/dL  Borderline High  130-159 mg/dL  High             160-189 mg/dL  Very High        >189 mg/dL    Magnesium [[id number]]  (Normal) Collected: 03/26/24 1313    Specimen: Blood from Arm, Left Updated: 03/26/24 1359     Magnesium 2.0 mg/dL     TSH [[id number]]  (Normal) Collected: 03/26/24 1051    Specimen: Blood Updated: 03/26/24 1312     TSH 0.515 uIU/mL     T4, Free [[id number]]  (Normal) Collected: 03/26/24 1051    Specimen: Blood Updated: 03/26/24 1312     Free T4 1.25 ng/dL     Narrative:      Results may be falsely  increased if patient taking Biotin.      Boaz Draw [[id number]] Collected: 03/26/24 1051    Specimen: Blood Updated: 03/26/24 1202    Narrative:      The following orders were created for panel order Boaz Draw.  Procedure                               Abnormality         Status                     ---------                               -----------         ------                     Green Top (Gel)[[id number]]                                  Final result               Lavender Top[[id number]]                                     Final result               Gold Top - SST[[id number]]                                   Final result               Light Blue Top[[id number]]                                   Final result                 Please view results for these tests on the individual orders.    Lavender Top [[id number]] Collected: 03/26/24 1051    Specimen: Blood Updated: 03/26/24 1202     Extra Tube hold for add-on     Comment: Auto resulted       Gold Top - SST [[id number]] Collected: 03/26/24 1051    Specimen: Blood Updated: 03/26/24 1202     Extra Tube Hold for add-ons.     Comment: Auto resulted.       Light Blue Top [[id number]] Collected: 03/26/24 1051    Specimen: Blood Updated: 03/26/24 1202     Extra Tube Hold for add-ons.     Comment: Auto resulted       Green Top (Gel) [[id number]] Collected: 03/26/24 1051    Specimen: Blood Updated: 03/26/24 1139     Extra Tube hide    BNP [[id number]]  (Abnormal) Collected: 03/26/24 1051    Specimen: Blood Updated: 03/26/24 1127     proBNP 2,025.0 pg/mL     Narrative:      This assay is used as an aid in the diagnosis of individuals suspected of having heart failure. It can be used as an aid in the diagnosis of acute decompensated heart failure (ADHF) in patients presenting with signs and symptoms of ADHF to the emergency department (ED). In addition, NT-proBNP of <300 pg/mL indicates ADHF is not likely.    Age Range Result Interpretation  NT-proBNP Concentration  (pg/mL:      <50             Positive            >450                   Gray                 300-450                    Negative             <300    50-75           Positive            >900                  Gray                300-900                  Negative            <300      >75             Positive            >1800                  Gray                300-1800                  Negative            <300    Comprehensive Metabolic Panel [[id number]]  (Abnormal) Collected: 03/26/24 1051    Specimen: Blood Updated: 03/26/24 1127     Glucose 108 mg/dL      BUN 19 mg/dL      Creatinine 0.95 mg/dL      Sodium 142 mmol/L      Potassium 4.0 mmol/L      Chloride 106 mmol/L      CO2 28.0 mmol/L      Calcium 9.4 mg/dL      Total Protein 5.8 g/dL      Albumin 3.9 g/dL      ALT (SGPT) 8 U/L      AST (SGOT) 9 U/L      Alkaline Phosphatase 43 U/L      Total Bilirubin 0.6 mg/dL      Globulin 1.9 gm/dL      A/G Ratio 2.1 g/dL      BUN/Creatinine Ratio 20.0     Anion Gap 8.0 mmol/L      eGFR 80.9 mL/min/1.73     Narrative:      GFR Normal >60  Chronic Kidney Disease <60  Kidney Failure <15    The GFR formula is only valid for adults with stable renal function between ages 18 and 70.    High Sensitivity Troponin T [[id number]]  (Abnormal) Collected: 03/26/24 1051    Specimen: Blood Updated: 03/26/24 1127     HS Troponin T 41 ng/L     Narrative:      High Sensitive Troponin T Reference Range:  <14.0 ng/L- Negative Female for AMI  <22.0 ng/L- Negative Male for AMI  >=14 - Abnormal Female indicating possible myocardial injury.  >=22 - Abnormal Male indicating possible myocardial injury.   Clinicians would have to utilize clinical acumen, EKG, Troponin, and serial changes to determine if it is an Acute Myocardial Infarction or myocardial injury due to an underlying chronic condition.         CBC & Differential [[id number]]  (Abnormal) Collected: 03/26/24 1051    Specimen: Blood Updated: 03/26/24 1104    Narrative:      The following  orders were created for panel order CBC & Differential.  Procedure                               Abnormality         Status                     ---------                               -----------         ------                     CBC Auto Differential[[id number]]        Abnormal            Final result                 Please view results for these tests on the individual orders.    CBC Auto Differential [[id number]]  (Abnormal) Collected: 03/26/24 1051    Specimen: Blood Updated: 03/26/24 1104     WBC 8.58 10*3/mm3      RBC 3.80 10*6/mm3      Hemoglobin 11.6 g/dL      Hematocrit 36.5 %      MCV 96.1 fL      MCH 30.5 pg      MCHC 31.8 g/dL      RDW 12.7 %      RDW-SD 44.9 fl      MPV 9.6 fL      Platelets 189 10*3/mm3      Neutrophil % 65.4 %      Lymphocyte % 27.0 %      Monocyte % 6.2 %      Eosinophil % 0.7 %      Basophil % 0.5 %      Immature Grans % 0.2 %      Neutrophils, Absolute 5.61 10*3/mm3      Lymphocytes, Absolute 2.32 10*3/mm3      Monocytes, Absolute 0.53 10*3/mm3      Eosinophils, Absolute 0.06 10*3/mm3      Basophils, Absolute 0.04 10*3/mm3      Immature Grans, Absolute 0.02 10*3/mm3      nRBC 0.0 /100 WBC             Imaging Results (Most Recent)       Procedure Component Value Units Date/Time    XR Chest 1 View [[id number]] Collected: 03/26/24 1110     Updated: 03/26/24 1113    Narrative:      XR CHEST 1 VW    Date of Exam: 3/26/2024 11:05 AM EDT    Indication: Chest Pain Protocol  Chest Pain Protocol    Comparison: 8/24/2021.    Findings:  There is stable mild to moderate cardiomegaly. There are low lung volumes with poor inspiration. There is mild crowding of vascular markings. There are no definite acute infiltrates or effusions.      Impression:      Impression:  Somewhat limited exam. Stable cardiomegaly. No acute process.      Electronically Signed: Saskia Fernandez MD    3/26/2024 11:11 AM EDT    Workstation ID: [id number]          reviewed    ECG/EMG Results (most recent)       Procedure  Component Value Units Date/Time    ECG 12 Lead [[id number]]  (Abnormal) Resulted: 03/26/24 1258     Updated: 03/26/24 1258    ECG 12 Lead Chest Pain [[id number]] Collected: 03/26/24 1022     Updated: 03/27/24 0612     QT Interval 435 ms      QTC Interval 482 ms     Narrative:      HEART RATE= 73  bpm  RR Interval= 816  ms  WY Interval=   ms  P Horizontal Axis=   deg  P Front Axis=   deg  QRSD Interval= 106  ms  QT Interval= 435  ms  QTcB= 482  ms  QRS Axis= -24  deg  T Wave Axis= -6  deg  - ABNORMAL ECG -  Atrial fibrillation  Ventricular premature complex  When compared with ECG of 22-Mar-2024 12:07:28,  No significant change  Electronically Signed By: Logan Caruso ([facility]) 27-Mar-2024 06:12:01  Date and Time of Study: 2024-03-26 10:22:25    Adult Transthoracic Echo Complete W/ Cont if Necessary Per Protocol [[id number]] Resulted: 03/27/24 1238     Updated: 03/27/24 1241     LV GLOBAL STRAIN  -14.0 %      LVIDd 5.1 cm      LVIDs 3.4 cm      IVSd 1.10 cm      LVPWd 1.10 cm      FS 33.3 %      IVS/LVPW 1.00 cm      ESV(cubed) 39.3 ml      LV Sys Vol (BSA corrected) 22.9 cm2      EDV(cubed) 132.7 ml      LV Moise Vol (BSA corrected) 47.6 cm2      LV mass(C)d 213.9 grams      LVOT area 3.8 cm2      LVOT diam 2.20 cm      EDV(MOD-sp4) 110.0 ml      ESV(MOD-sp4) 52.9 ml      SV(MOD-sp4) 57.1 ml      SI(MOD-sp4) 24.7 ml/m2      EF(MOD-sp4) 51.9 %      MV E max ben 81.2 cm/sec      MV A max ben 22.5 cm/sec      MV dec time 0.19 sec      MV E/A 3.6     LA ESV Index (BP) 37.0 ml/m2      Med Peak E' Ben 8.9 cm/sec      Lat Peak E' Ben 13.9 cm/sec      TR max ben 212.0 cm/sec      Avg E/e' ratio 7.12     SV(LVOT) 83.2 ml      RVIDd 3.9 cm      TAPSE (>1.6) 2.14 cm      RV S' 18.3 cm/sec      LA dimension (2D)  5.1 cm      LV V1 max 111.0 cm/sec      LV V1 max PG 4.9 mmHg      LV V1 mean PG 2.00 mmHg      LV V1 VTI 21.9 cm      Ao pk ben 140.0 cm/sec      Ao max PG 7.8 mmHg      Ao mean PG 4.0 mmHg      Ao V2 VTI 22.9 cm       DEJAN(I,D) 3.6 cm2      MV max PG 2.8 mmHg      MV mean PG 1.00 mmHg      MV V2 VTI 17.5 cm      MV P1/2t 68.9 msec      MVA(P1/2t) 3.2 cm2      MVA(VTI) 4.8 cm2      MV dec slope 381.0 cm/sec2      TR max PG 18.0 mmHg      RVSP(TR) 21.0 mmHg      RAP systole 3.0 mmHg      RV V1 max PG 1.98 mmHg      RV V1 max 70.3 cm/sec      RV V1 VTI 14.1 cm      PA V2 max 132.0 cm/sec      PA acc time 0.12 sec      ACS 1.60 cm      Sinus 3.4 cm      STJ 2.9 cm      EF(MOD-bp) 52.0 %     Narrative:        Left ventricular systolic function is normal. Calculated left   ventricular EF = 52% Left ventricular ejection fraction appears to be 51 -   55%.    Left ventricular diastolic dysfunction is noted.    The right ventricular cavity is mildly dilated.    The left atrial cavity is severely dilated.    The right atrial cavity is severely  dilated.    Estimated right ventricular systolic pressure from tricuspid   regurgitation is normal (<35 mmHg).            Conclusion      Normal LV size and contractility EF of 50 to 65%  Mild right ventricular enlargement seen.  Severe left atrial enlargement seen.  Severe right atrial enlargement seen.  Pulmonic valve is not well visualized.  Aortic valve, mitral valve, tricuspid valve appears structurally normal,   trace mitral and tricuspid regurgitation seen.  Calculated RV systolic pressure 21 mmHg.  No pericardial effusion seen.  Proximal aorta appears normal in size.            reviewed        Results for orders placed during the hospital encounter of 03/26/24    Adult Transthoracic Echo Complete W/ Cont if Necessary Per Protocol    Interpretation Summary    Left ventricular systolic function is normal. Calculated left ventricular EF = 52% Left ventricular ejection fraction appears to be 51 - 55%.    Left ventricular diastolic dysfunction is noted.    The right ventricular cavity is mildly dilated.    The left atrial cavity is severely dilated.    The right atrial cavity is severely   dilated.    Estimated right ventricular systolic pressure from tricuspid regurgitation is normal (<35 mmHg).    Conclusion      Normal LV size and contractility EF of 50 to 65%  Mild right ventricular enlargement seen.  Severe left atrial enlargement seen.  Severe right atrial enlargement seen.  Pulmonic valve is not well visualized.  Aortic valve, mitral valve, tricuspid valve appears structurally normal, trace mitral and tricuspid regurgitation seen.  Calculated RV systolic pressure 21 mmHg.  No pericardial effusion seen.  Proximal aorta appears normal in size.      Microbiology Results (last 10 days)       ** No results found for the last 240 hours. **            Assessment & Plan     New onset a-fib       Atrial fibrillation        Lab Results   Component Value Date     TROPONINT 35 (H) 03/26/2024     TROPONINT 41 (H) 03/26/2024   -History of CAD s/p PCI (2013), endocarditis   -Start ASA   -Lipid panel, A1c and thyroid panel within normal limits  -Chest X-ray: Stable cardiomegaly with no acute process  -EKG on 3/22 showed atrial fibrillation with controlled rate and abnormal T wave, EKG today shows sinus rhythm with PVCs  -Stress Test and echocardiogram ordered   -Telemetry  -Cardiology consulted who recommended echocardiogram and stress test as well as initiation of aspirin, statin and beta-blockers along with anticoagulation     History of ptosis and blindness  -Follows with [facility] ophthalmology and scheduled to have ptosis repair of left eye today but EKG last week revealed atrial fibrillation and again today upon exam   -History of retinal detachment to right eye  -Neurology consulted who recommended outpatient referral for EMG and nerve conduction studies     Chronic pack pain/spinal stenosis  -Continue hydrocodone home regimen     Hypertension  -Poorly Controlled       BP Readings from Last 1 Encounters:   03/26/24 145/69   - Monitor while admitted  -Start lasix, monitor renal function       BPH  -Continue Flomax      GERD  -Continue PPI     I discussed the patients findings and my recommendations with patient and nursing staff.     Discharge Diagnosis:      New onset a-fib      Hospital Course  Patient is a 78 y.o. male presented with abnormal EKG found on outpatient study with an HPI noted above.  Serial troponins were assessed and found to be 41, 35 with lipid panel showing total cholesterol 157 with an LDL of 85 and an HDL of 56.  TSH was within normal limits at 0.515 with a free T4 of 1.25.  A1c was found to be 5.20.  proBNP was somewhat elevated 2025.0.  Repeat EKG on 3/26/2024 showed A-fib at 73 with PVC.  Cardiology was consulted who evaluated patient recommending echocardiogram.  Stress test reported without evidence of ischemia with an EF of 65%.  They also recommended consideration of initiation of aspirin, statin, beta-blocker and anticoagulation.  Neurology was also consulted who recommended further workup for possible myasthenia gravis with recommendations for outpatient EMG and nerve conduction studies.  At this time patient is felt to be in good condition for discharge with close follow-up with his PCP as well as cardiology, neurology and ophthalmology on an outpatient basis.  His full testing/results and plan were discussed with patient along with concerning/alarm symptoms for which to call 911/return to the ED.  All questions were answered and he verbalizes his understanding and agreement.    Past Medical History:     Past Medical History:   Diagnosis Date    Arthritis     Back pain     Blind right eye     CAD (coronary artery disease)     Esophagus disorder     several dilations    GERD (gastroesophageal reflux disease)     History of BPH     [place] (hard of hearing)     Lumbar stenosis with neurogenic claudication     Seasonal allergies        Past Surgical History:     Past Surgical History:   Procedure Laterality Date    BACK SURGERY      x3    CHOLECYSTECTOMY      COLONOSCOPY W/  POLYPECTOMY      CORONARY ANGIOPLASTY WITH STENT PLACEMENT      2013    ESOPHAGEAL DILATATION      EYE SURGERY      SPINAL FUSION      TOTAL HIP ARTHROPLASTY         Social History:   Social History     Socioeconomic History    Marital status:    Tobacco Use    Smoking status: Never   Vaping Use    Vaping status: Never Used   Substance and Sexual Activity    Alcohol use: Never    Drug use: Never    Sexual activity: Defer       Procedures Performed         Consults:   Consults       Date and Time Order Name Status Description    3/26/2024  2:18 PM Inpatient Neurology Consult General      3/26/2024 11:15 AM Inpatient Cardiology Consult Completed             Condition on Discharge:     Stable    Discharge Disposition  Home or Self Care    Discharge Medications     Discharge Medications        New Medications        Instructions Start Date   aspirin 81 MG EC tablet   81 mg, Oral, Daily   Start Date: March 28, 2024     atorvastatin 10 MG tablet  Commonly known as: LIPITOR   20 mg, Oral, Daily      Eliquis DVT/PE Starter Pack tablet therapy pack  Generic drug: Apixaban Starter Pack   5 mg, Oral, Take As Directed      metoprolol succinate XL 25 MG 24 hr tablet  Commonly known as: Toprol XL   25 mg, Oral, Daily             Continue These Medications        Instructions Start Date   HYDROcodone-acetaminophen  MG per tablet  Commonly known as: NORCO   1 tablet, Oral, Every 6 Hours PRN      tamsulosin 0.4 MG capsule 24 hr capsule  Commonly known as: FLOMAX   2 capsules, Oral, Every Night at Bedtime               Discharge Diet:     Activity at Discharge:     Follow-up Appointments  No future appointments.    Additional Instructions for the Follow-ups that You Need to Schedule       Discharge Follow-up with PCP   As directed       Currently Documented PCP:    Sybil Fleming MD    PCP Phone Number:    [phone]     Follow Up Details: 5 to 7 days        Discharge Follow-up with Specified Provider:  Cardiology; 2 Weeks   As directed      To: Cardiology   Follow Up: 2 Weeks                Test Results Pending at Discharge       Risk for Readmission (LACE) Score: 1 (3/27/2024  6:00 AM)      Greater than 30 minutes spent in discharge activities for this patient    Signature:Electronically signed by Sesar Corley PA-C, 03/27/24, 5:10 PM EDT.

## 2024-03-29 ENCOUNTER — TELEPHONE (OUTPATIENT)
Dept: CARDIOLOGY | Facility: CLINIC | Age: 79
End: 2024-03-29
Payer: MEDICARE

## 2024-03-29 NOTE — TELEPHONE ENCOUNTER
Caller: ONOFRE GRACIA    Relationship to patient: Emergency Contact    Best call back number: [phone]    Chief complaint: NONE    Type of visit: HOSPITAL FOLLOW UP    Requested date: ? , PATIENT WAS SCHEDULED FOR 5/14/24 THAT IS NEXT AVAILABLE WITH DR TAYLOR, PATIENT DISCHARGE NOTES GAVE BOTH A 2 WEEK AND A 1 MONTH TIMEFRAME, PLEASE CALL PATIENT TO RESCHEDULE IF HE NEEDS TO BE SEEN SOONER.

## 2024-04-05 NOTE — TELEPHONE ENCOUNTER
Caller: shaunna arreola    Relationship: Emergency Contact    Best call back number: [phone]    Which medication are you concerned about: METOPROLOL SUCCINATE 25 MG, ATORVASTATIN 10 MG AND ASPIRIN 81 MG    What are your concerns: PT'S WIFE IS CALLING TO SEE IF PT NEEDS TO HAVE MEDICATION CHANGED BECAUSE HE'S BEEN FEELING NAUSEOUS. PLEASE ADVISE

## 2024-04-05 NOTE — TELEPHONE ENCOUNTER
[phone]      Spouse contacted, made aware that we do not have a HIPAA for the office and will need to make a 2 week appointment with the patient directly.       Pt will monitor BP and HR over the next few days to see if this is causing the dizziness.

## 2024-04-05 NOTE — TELEPHONE ENCOUNTER
Pt called to discuss if he needs to be seen sooner than the 14th of May  Pt said his BP is good, feels fine  I asked if he feels like he needs a sooner appt he said no     Will see pt 5/14/24

## 2024-05-09 RX ORDER — METOPROLOL SUCCINATE 25 MG/1
25 TABLET, EXTENDED RELEASE ORAL DAILY
Qty: 30 TABLET | Refills: 0 | Status: SHIPPED | OUTPATIENT
Start: 2024-05-09 | End: 2024-05-14 | Stop reason: SDUPTHER

## 2024-05-09 NOTE — TELEPHONE ENCOUNTER
PATIENTS WIFE ADVISED THAT ON 5.8.24 THE PATIENT WAS EXPERIENCING CHEST PAIN FOR AT LEAST 3-4 HOURS. HE DID SAY HE HAD A FUNNY FEELING AROUND HIS MOUTH BUT AFTER AWHILE IT WENT AWAY. UPON CHECKING HIS BP WHEN HE GOT HOME IT WOULD BE HIGH AND THE LOW.    HE IS NOT EXPERIENCING ANY OF THIS TODAY. BUT THE WIFE WOULD LIKE HIM TO BE SEEN SOONER THEN HIS SCHEDULED FOLLOW UP.

## 2024-05-09 NOTE — TELEPHONE ENCOUNTER
Caller: arreolashaunna    Relationship: Emergency Contact    Best call back number: [phone]    Requested Prescriptions:   Requested Prescriptions     Pending Prescriptions Disp Refills    metoprolol succinate XL (Toprol XL) 25 MG 24 hr tablet 30 tablet 0     Sig: Take 1 tablet by mouth Daily for 30 days.        Pharmacy where request should be sent: [id number] - [place], [place] [address] - [phone]  - [phone] FX     Last office visit with prescribing clinician: Visit date not found   Last telemedicine visit with prescribing clinician: Visit date not found   Next office visit with prescribing clinician: 5/14/2024         Does the patient have less than a 3 day supply:  [x] Yes  [] No    Would you like a call back once the refill request has been completed: [x] Yes [] No    If the office needs to give you a call back, can they leave a voicemail: [x] Yes [] No    Maritza De La Vega Rep   05/09/24 10:34 EDT

## 2024-05-09 NOTE — TELEPHONE ENCOUNTER
PATIENT/SPOUSE DECLINED APT WITH JOSÉ MIGUEL TOMORROW. THEY WANT TO KEEP APT 5/14 WITH DR. TAYLOR.

## 2024-05-14 ENCOUNTER — TELEPHONE (OUTPATIENT)
Dept: CARDIOLOGY | Facility: CLINIC | Age: 79
End: 2024-05-14

## 2024-05-14 ENCOUNTER — OFFICE VISIT (OUTPATIENT)
Dept: CARDIOLOGY | Facility: CLINIC | Age: 79
End: 2024-05-14
Payer: MEDICARE

## 2024-05-14 VITALS
HEART RATE: 54 BPM | HEIGHT: 72 IN | SYSTOLIC BLOOD PRESSURE: 122 MMHG | DIASTOLIC BLOOD PRESSURE: 64 MMHG | OXYGEN SATURATION: 98 % | WEIGHT: 237 LBS | BODY MASS INDEX: 32.1 KG/M2

## 2024-05-14 DIAGNOSIS — Z01.810 PREOPERATIVE CARDIOVASCULAR EXAMINATION: Primary | ICD-10-CM

## 2024-05-14 DIAGNOSIS — I25.10 CAD S/P PERCUTANEOUS CORONARY ANGIOPLASTY: ICD-10-CM

## 2024-05-14 DIAGNOSIS — Z98.61 CAD S/P PERCUTANEOUS CORONARY ANGIOPLASTY: ICD-10-CM

## 2024-05-14 DIAGNOSIS — I10 ESSENTIAL HYPERTENSION: ICD-10-CM

## 2024-05-14 DIAGNOSIS — I48.0 PAROXYSMAL ATRIAL FIBRILLATION: ICD-10-CM

## 2024-05-14 DIAGNOSIS — E78.5 DYSLIPIDEMIA: ICD-10-CM

## 2024-05-14 RX ORDER — METOPROLOL SUCCINATE 25 MG/1
25 TABLET, EXTENDED RELEASE ORAL DAILY
Qty: 90 TABLET | Refills: 3 | Status: SHIPPED | OUTPATIENT
Start: 2024-05-14 | End: 2025-05-09

## 2024-05-14 RX ORDER — ATORVASTATIN CALCIUM 10 MG/1
20 TABLET, FILM COATED ORAL DAILY
Qty: 180 TABLET | Refills: 3 | Status: SHIPPED | OUTPATIENT
Start: 2024-05-14

## 2024-05-14 RX ORDER — ASPIRIN 81 MG/1
81 TABLET ORAL DAILY
Qty: 90 TABLET | Refills: 3 | Status: SHIPPED | OUTPATIENT
Start: 2024-05-14

## 2024-05-14 NOTE — TELEPHONE ENCOUNTER
Pt seen today in office   Pt said he is having colonoscopy   Dr Aguilar signed clearance to hold blood thinner for 7 days prior     Will look into this further to find out who I need to fax this to

## 2024-05-14 NOTE — PROGRESS NOTES
Subjective:     Encounter Date:05/14/2024      Patient ID: Rufino Vasques is a 78 y.o. male.    Chief Complaint and history of present illness:    Follow-up for A-fib, CAD, PCI, endocarditis, dyslipidemia       History of present illness:       Mr. Rufino Vasques has a PMH of      - CAD status post PCI (2013 details not available)   - history of Endocarditis (treated with 6 weeks IV abx)  - GERD/previous esophageal dilations  - Chronic back pain secondary to spinal stenosis ([facility])  - Back surgeries x 4, right eye surgery x 5, hip replacement, cholecystectomy  - Blindness in right eye, ptosis left eye, hard of hearing  - Family history of father with valvular disease.  - No known allergies  - Non-smoker     Here for hospital follow-up.  Patient was recently in the hospital 3/26/2024 with A-fib has previous history of CAD and PCI and endocarditis.  Patient saw an ophthalmologist who noticed patient was in A-fib and sent him to the ER.  Was started on anticoagulation and was supposed to see me in 2 weeks.  Patient was preop for colonoscopy and eye surgery for ptosis repair.  Patient called [facility] To make an appointment earliest appointment they could give was May 14.  They advised him to call our office if he needs an earlier appointment.  [facility] staff called the patient to see if he needs a earlier appointment and patient said he is doing fine he can wait till May 14 therefore is here on May 14.  Patient has been holding his Eliquis and has not been taking.  The dose of Eliquis prescribed to him by the hospital was DVT starter pack not for A-fib.  Patient denies any chest pain or shortness of breath.  Patient had some nausea after starting on beta-blockers.  Is better now, as not having any further nausea.    Patient's arterial blood pressure is 122/64, heart rate 54, O2 sat of 98% on room air.        Labs in the ED showed high-sensitivity troponin of 41--35, proBNP 2025 glucose 108  hemoglobin A1c 5.2 TSH normal hemoglobin 11.6 lipid panel normal x-ray shows stable cardiomegaly with no acute process     [id number] SCORE   [id number] Score: 3 (3/26/2024  2:29 PM)    Labs 3/26/2024 reveal CMP with total protein of 5.8, glucose 108, A1c 5.2, TSH 0.515.  Lipid profile with cholesterol 157, triglycerides 83, HDL 56, LDL 85.              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  Patient's troponin was 41, BNP was mildly elevated.  Echo revealed normal LV  Patient has history of CAD PCI in the past and not followed up with cardiology or takes any medication given aspirin.  Counseled on importance of compliance.  Patient was started on on aspirin and statins and beta-blockers, Eliquis.  Patient is only taking metoprolol and atorvastatin.  Patient has high [id number] score will benefit from long-term anticoagulation to prevent thromboembolic events.  Will follow-up and start the same.  TSH is normal at 0.515 and FT4 is normal.    Patient underwent echocardiogram 3/27/2024 which revealed normal LV systolic function, severe biatrial enlargement consistent with chronic atrial fibrillation.  Patient advised Lexiscan Cardiolite 3/27/2024 which was negative for ischemia.  Will treat him medically with Eliquis 5 twice daily, aspirin, beta-blockers, statins as tolerated.  I advised him not to take the starter pack which is a higher dose more for DVT and PE.  Patient states he cannot afford Eliquis and does not want to take Eliquis.  Discussed at length about taking warfarin and pros and cons of warfarin versus Eliquis.  Patient does  not want the restrictions of warfarin and frequent checks of PT/INR, is inconvenient him.  Therefore he does not want to go on anticoagulation and take the risk of strokes.  I have discussed his risk of stroke from A-fib with patient and his wife.  Patient says he has lived 79 years and does not want to have lifestyle inconveniences and does not want to take anticoagulation.  Patient should be okay for colonoscopy since she had recent stress test which was negative.  Patient does not want to have eye surgery done.    Discussed with patient and his wife at bedside  Spent over 60 minutes taking care of patient today reviewing the records, counseling patient about anticoagulation, documentation and examining the patient and coming up with plan.  Will follow-up closely in 3 months and check labs before visit.      Procedures    Copied text in this portion of the note has been reviewed and is accurate as of 5/16/2024  The following portions of the patient's history were reviewed and updated as appropriate: allergies, current medications, past family history, past medical history, past social history, past surgical history and problem list.    Assessment:         MDM       Diagnosis Plan   1. Preoperative cardiovascular examination  Comprehensive Metabolic Panel    Lipid Panel    BNP      2. Paroxysmal atrial fibrillation  Comprehensive Metabolic Panel    Lipid Panel    BNP      3. CAD S/P percutaneous coronary angioplasty  Comprehensive Metabolic Panel    Lipid Panel    BNP      4. Essential hypertension  Comprehensive Metabolic Panel    Lipid Panel    BNP      5. Dyslipidemia  Comprehensive Metabolic Panel    Lipid Panel    BNP             Plan:               Past Medical History:  Past Medical History:   Diagnosis Date    Arthritis     Back pain     Blind right eye     CAD (coronary artery disease)     Esophagus disorder     several dilations    GERD (gastroesophageal reflux disease)     History of BPH     [place] (hard of  "hearing)     Lumbar stenosis with neurogenic claudication     Seasonal allergies      Past Surgical History:  Past Surgical History:   Procedure Laterality Date    BACK SURGERY      x3    CHOLECYSTECTOMY      COLONOSCOPY W/ POLYPECTOMY      CORONARY ANGIOPLASTY WITH STENT PLACEMENT      2013    ESOPHAGEAL DILATATION      EYE SURGERY      SPINAL FUSION      TOTAL HIP ARTHROPLASTY        Allergies:  No Known Allergies  Home Meds:  Current Meds:     Current Outpatient Medications:     atorvastatin (LIPITOR) 10 MG tablet, Take 2 tablets by mouth Daily., Disp: 180 tablet, Rfl: 3    HYDROcodone-acetaminophen (NORCO)  MG per tablet, Take 1 tablet by mouth Every 6 (Six) Hours As Needed for Moderate Pain., Disp: , Rfl:     metoprolol succinate XL (Toprol XL) 25 MG 24 hr tablet, Take 1 tablet by mouth Daily for 360 days., Disp: 90 tablet, Rfl: 3    apixaban (ELIQUIS) 5 MG tablet tablet, Take 1 tablet by mouth 2 (Two) Times a Day., Disp: 180 tablet, Rfl: 3    aspirin 81 MG EC tablet, Take 1 tablet by mouth Daily., Disp: 90 tablet, Rfl: 3  Social History:   Social History     Tobacco Use    Smoking status: Never     Passive exposure: Never    Smokeless tobacco: Never   Substance Use Topics    Alcohol use: Never      Family History:  Family History   Problem Relation Age of Onset    Malig Hyperthermia Neg Hx               Review of Systems   Cardiovascular:  Positive for leg swelling. Negative for chest pain and palpitations.   Respiratory:  Negative for shortness of breath.    Neurological:  Positive for dizziness. Negative for numbness.     All other systems are negative         Objective:     Physical Exam  /64 (BP Location: Left arm, Patient Position: Sitting, Cuff Size: Large Adult)   Pulse 54   Ht 182.9 cm (72\")   Wt 108 kg (237 lb)   SpO2 98%   BMI 32.14 kg/m²   General:  Appears in no acute distress  Eyes: Sclera is anicteric,  conjunctiva is clear   HEENT:  No JVD.  No carotid bruits  Respiratory: " "Respirations regular and unlabored at rest.  Clear to auscultation  Cardiovascular: S1,S2 Regular rate and rhythm. .   Extremities: No digital clubbing or cyanosis, no edema  Skin: Color pink. Skin warm and dry to touch. No rashes  No xanthoma  Neuro: Alert and awake.    Lab Reviewed:         Pieter Aguilar MD  5/16/2024 18:24 EDT      EMR Dragon/Transcription:   \"Dictated utilizing Dragon dictation\".        "

## 2024-05-21 ENCOUNTER — TELEPHONE (OUTPATIENT)
Dept: CARDIOLOGY | Facility: CLINIC | Age: 79
End: 2024-05-21
Payer: MEDICARE

## 2024-05-21 NOTE — TELEPHONE ENCOUNTER
Caller: STEFANIA    Relationship: PROVIDER    Best call back number: [phone]      What was the call regarding: PLEASE FAX OFFICE NOTE  FROM 5/14/24 TO [facility]- ATTN STEFANIA [phone]- THIS IS FOR THE PATIENT'S COLONOSCOPY TOMORROW

## 2025-02-05 ENCOUNTER — OFFICE (AMBULATORY)
Dept: RURAL CLINIC 3 | Facility: CLINIC | Age: 80
End: 2025-02-05
Payer: MEDICARE

## 2025-02-05 VITALS
HEART RATE: 78 BPM | DIASTOLIC BLOOD PRESSURE: 75 MMHG | HEIGHT: 73 IN | WEIGHT: 240 LBS | SYSTOLIC BLOOD PRESSURE: 138 MMHG

## 2025-02-05 DIAGNOSIS — Z79.01 LONG TERM (CURRENT) USE OF ANTICOAGULANTS: ICD-10-CM

## 2025-02-05 DIAGNOSIS — Z86.0101 PERSONAL HISTORY OF ADENOMATOUS AND SERRATED COLON POLYPS: ICD-10-CM

## 2025-02-05 DIAGNOSIS — I48.91 UNSPECIFIED ATRIAL FIBRILLATION: ICD-10-CM

## 2025-02-05 DIAGNOSIS — R13.10 DYSPHAGIA, UNSPECIFIED: ICD-10-CM

## 2025-02-05 PROCEDURE — 99204 OFFICE O/P NEW MOD 45 MIN: CPT | Performed by: INTERNAL MEDICINE

## 2025-03-20 ENCOUNTER — TELEPHONE (OUTPATIENT)
Dept: CARDIOLOGY | Facility: CLINIC | Age: 80
End: 2025-03-20
Payer: MEDICARE

## 2025-03-20 NOTE — TELEPHONE ENCOUNTER
Pt needs a follow up with Dr. Aguilar with blood work. Please reach out to schedule.     Last office visit, he was to follow up in 3 months with labs.

## 2025-03-20 NOTE — TELEPHONE ENCOUNTER
Pt refused follow up appt until he resolves a few other medical issues. He will call back when he is ready to schedule.

## 2025-07-09 ENCOUNTER — HOSPITAL ENCOUNTER (INPATIENT)
Facility: HOSPITAL | Age: 80
LOS: 1 days | Discharge: HOME | End: 2025-07-10
Attending: EMERGENCY MEDICINE | Admitting: STUDENT IN AN ORGANIZED HEALTH CARE EDUCATION/TRAINING PROGRAM
Payer: MEDICARE

## 2025-07-09 ENCOUNTER — APPOINTMENT (OUTPATIENT)
Dept: GENERAL RADIOLOGY | Facility: HOSPITAL | Age: 80
End: 2025-07-09
Payer: MEDICARE

## 2025-07-09 ENCOUNTER — TELEPHONE (OUTPATIENT)
Dept: CARDIOLOGY | Facility: CLINIC | Age: 80
End: 2025-07-09
Payer: MEDICARE

## 2025-07-09 ENCOUNTER — OFFICE VISIT (OUTPATIENT)
Dept: CARDIOLOGY | Facility: CLINIC | Age: 80
End: 2025-07-09
Payer: MEDICARE

## 2025-07-09 VITALS
DIASTOLIC BLOOD PRESSURE: 91 MMHG | OXYGEN SATURATION: 99 % | HEART RATE: 70 BPM | BODY MASS INDEX: 33.05 KG/M2 | SYSTOLIC BLOOD PRESSURE: 156 MMHG | WEIGHT: 244 LBS | HEIGHT: 72 IN

## 2025-07-09 DIAGNOSIS — R07.9 CHEST PAIN, UNSPECIFIED TYPE: Primary | ICD-10-CM

## 2025-07-09 DIAGNOSIS — I20.0 UNSTABLE ANGINA: Primary | ICD-10-CM

## 2025-07-09 DIAGNOSIS — I25.10 CAD S/P PERCUTANEOUS CORONARY ANGIOPLASTY: ICD-10-CM

## 2025-07-09 DIAGNOSIS — Z98.61 CAD S/P PERCUTANEOUS CORONARY ANGIOPLASTY: ICD-10-CM

## 2025-07-09 DIAGNOSIS — I10 ESSENTIAL HYPERTENSION: ICD-10-CM

## 2025-07-09 DIAGNOSIS — I48.19 PERSISTENT ATRIAL FIBRILLATION: ICD-10-CM

## 2025-07-09 LAB
ALBUMIN SERPL-MCNC: 4.3 G/DL (ref 3.5–5.2)
ALBUMIN/GLOB SERPL: 2 G/DL
ALP SERPL-CCNC: 52 U/L (ref 39–117)
ALT SERPL W P-5'-P-CCNC: 14 U/L (ref 1–41)
ANION GAP SERPL CALCULATED.3IONS-SCNC: 11 MMOL/L (ref 5–15)
AST SERPL-CCNC: 18 U/L (ref 1–40)
BASOPHILS # BLD AUTO: 0.03 10*3/MM3 (ref 0–0.2)
BASOPHILS NFR BLD AUTO: 0.3 % (ref 0–1.5)
BILIRUB SERPL-MCNC: 0.6 MG/DL (ref 0–1.2)
BUN SERPL-MCNC: 15.6 MG/DL (ref 8–23)
BUN/CREAT SERPL: 16.8 (ref 7–25)
CALCIUM SPEC-SCNC: 9.4 MG/DL (ref 8.6–10.5)
CHLORIDE SERPL-SCNC: 104 MMOL/L (ref 98–107)
CO2 SERPL-SCNC: 25 MMOL/L (ref 22–29)
CREAT SERPL-MCNC: 0.93 MG/DL (ref 0.76–1.27)
DEPRECATED RDW RBC AUTO: 42.3 FL (ref 37–54)
EGFRCR SERPLBLD CKD-EPI 2021: 82 ML/MIN/1.73
EOSINOPHIL # BLD AUTO: 0.04 10*3/MM3 (ref 0–0.4)
EOSINOPHIL NFR BLD AUTO: 0.4 % (ref 0.3–6.2)
ERYTHROCYTE [DISTWIDTH] IN BLOOD BY AUTOMATED COUNT: 12.6 % (ref 12.3–15.4)
GEN 5 1HR TROPONIN T REFLEX: 34 NG/L
GLOBULIN UR ELPH-MCNC: 2.1 GM/DL
GLUCOSE SERPL-MCNC: 110 MG/DL (ref 65–99)
HCT VFR BLD AUTO: 39.8 % (ref 37.5–51)
HGB BLD-MCNC: 13.1 G/DL (ref 13–17.7)
HOLD SPECIMEN: NORMAL
IMM GRANULOCYTES # BLD AUTO: 0.04 10*3/MM3 (ref 0–0.05)
IMM GRANULOCYTES NFR BLD AUTO: 0.4 % (ref 0–0.5)
INR PPP: 3.96 (ref 2–3)
LYMPHOCYTES # BLD AUTO: 1.58 10*3/MM3 (ref 0.7–3.1)
LYMPHOCYTES NFR BLD AUTO: 15.5 % (ref 19.6–45.3)
MCH RBC QN AUTO: 30.1 PG (ref 26.6–33)
MCHC RBC AUTO-ENTMCNC: 32.9 G/DL (ref 31.5–35.7)
MCV RBC AUTO: 91.5 FL (ref 79–97)
MONOCYTES # BLD AUTO: 0.76 10*3/MM3 (ref 0.1–0.9)
MONOCYTES NFR BLD AUTO: 7.5 % (ref 5–12)
NEUTROPHILS NFR BLD AUTO: 7.73 10*3/MM3 (ref 1.7–7)
NEUTROPHILS NFR BLD AUTO: 75.9 % (ref 42.7–76)
NRBC BLD AUTO-RTO: 0 /100 WBC (ref 0–0.2)
NT-PROBNP SERPL-MCNC: 2160 PG/ML (ref 0–1800)
PLATELET # BLD AUTO: 202 10*3/MM3 (ref 140–450)
PMV BLD AUTO: 10 FL (ref 6–12)
POTASSIUM SERPL-SCNC: 4.2 MMOL/L (ref 3.5–5.2)
PROT SERPL-MCNC: 6.4 G/DL (ref 6–8.5)
PROTHROMBIN TIME: 39.4 SECONDS (ref 19.4–28.5)
RBC # BLD AUTO: 4.35 10*6/MM3 (ref 4.14–5.8)
SODIUM SERPL-SCNC: 140 MMOL/L (ref 136–145)
TROPONIN T % DELTA: -3
TROPONIN T NUMERIC DELTA: -1 NG/L
TROPONIN T SERPL HS-MCNC: 35 NG/L
WBC NRBC COR # BLD AUTO: 10.18 10*3/MM3 (ref 3.4–10.8)

## 2025-07-09 PROCEDURE — 80053 COMPREHEN METABOLIC PANEL: CPT | Performed by: EMERGENCY MEDICINE

## 2025-07-09 PROCEDURE — 36415 COLL VENOUS BLD VENIPUNCTURE: CPT

## 2025-07-09 PROCEDURE — 93005 ELECTROCARDIOGRAM TRACING: CPT

## 2025-07-09 PROCEDURE — 85610 PROTHROMBIN TIME: CPT | Performed by: EMERGENCY MEDICINE

## 2025-07-09 PROCEDURE — 83036 HEMOGLOBIN GLYCOSYLATED A1C: CPT | Performed by: STUDENT IN AN ORGANIZED HEALTH CARE EDUCATION/TRAINING PROGRAM

## 2025-07-09 PROCEDURE — 71045 X-RAY EXAM CHEST 1 VIEW: CPT

## 2025-07-09 PROCEDURE — 85025 COMPLETE CBC W/AUTO DIFF WBC: CPT | Performed by: EMERGENCY MEDICINE

## 2025-07-09 PROCEDURE — 93005 ELECTROCARDIOGRAM TRACING: CPT | Performed by: EMERGENCY MEDICINE

## 2025-07-09 PROCEDURE — 84443 ASSAY THYROID STIM HORMONE: CPT | Performed by: STUDENT IN AN ORGANIZED HEALTH CARE EDUCATION/TRAINING PROGRAM

## 2025-07-09 PROCEDURE — 83880 ASSAY OF NATRIURETIC PEPTIDE: CPT | Performed by: EMERGENCY MEDICINE

## 2025-07-09 PROCEDURE — 99285 EMERGENCY DEPT VISIT HI MDM: CPT

## 2025-07-09 PROCEDURE — 84484 ASSAY OF TROPONIN QUANT: CPT | Performed by: EMERGENCY MEDICINE

## 2025-07-09 RX ORDER — CARVEDILOL 3.12 MG/1
3.12 TABLET ORAL 2 TIMES DAILY WITH MEALS
Status: DISCONTINUED | OUTPATIENT
Start: 2025-07-09 | End: 2025-07-10

## 2025-07-09 RX ORDER — PANTOPRAZOLE SODIUM 40 MG/1
40 TABLET, DELAYED RELEASE ORAL
Status: DISCONTINUED | OUTPATIENT
Start: 2025-07-09 | End: 2025-07-10 | Stop reason: HOSPADM

## 2025-07-09 RX ORDER — BISACODYL 5 MG/1
5 TABLET, DELAYED RELEASE ORAL DAILY PRN
Status: DISCONTINUED | OUTPATIENT
Start: 2025-07-09 | End: 2025-07-10 | Stop reason: HOSPADM

## 2025-07-09 RX ORDER — SODIUM CHLORIDE 0.9 % (FLUSH) 0.9 %
10 SYRINGE (ML) INJECTION AS NEEDED
Status: DISCONTINUED | OUTPATIENT
Start: 2025-07-09 | End: 2025-07-10 | Stop reason: HOSPADM

## 2025-07-09 RX ORDER — OMEPRAZOLE 20 MG/1
1 CAPSULE, DELAYED RELEASE ORAL DAILY
COMMUNITY
Start: 2025-05-17

## 2025-07-09 RX ORDER — AMOXICILLIN 250 MG
2 CAPSULE ORAL 2 TIMES DAILY PRN
Status: DISCONTINUED | OUTPATIENT
Start: 2025-07-09 | End: 2025-07-10 | Stop reason: HOSPADM

## 2025-07-09 RX ORDER — NITROGLYCERIN 0.4 MG/1
0.4 TABLET SUBLINGUAL
Status: DISCONTINUED | OUTPATIENT
Start: 2025-07-09 | End: 2025-07-09 | Stop reason: SDUPTHER

## 2025-07-09 RX ORDER — BISACODYL 10 MG
10 SUPPOSITORY, RECTAL RECTAL DAILY PRN
Status: DISCONTINUED | OUTPATIENT
Start: 2025-07-09 | End: 2025-07-10 | Stop reason: HOSPADM

## 2025-07-09 RX ORDER — HYDROCODONE BITARTRATE AND ACETAMINOPHEN 10; 325 MG/1; MG/1
1 TABLET ORAL EVERY 6 HOURS PRN
Refills: 0 | Status: DISCONTINUED | OUTPATIENT
Start: 2025-07-09 | End: 2025-07-10 | Stop reason: HOSPADM

## 2025-07-09 RX ORDER — SODIUM CHLORIDE 0.9 % (FLUSH) 0.9 %
10 SYRINGE (ML) INJECTION EVERY 12 HOURS SCHEDULED
Status: DISCONTINUED | OUTPATIENT
Start: 2025-07-09 | End: 2025-07-10 | Stop reason: HOSPADM

## 2025-07-09 RX ORDER — PREDNISONE 50 MG/1
50 TABLET ORAL DAILY
COMMUNITY
Start: 2025-07-03 | End: 2025-07-10 | Stop reason: HOSPADM

## 2025-07-09 RX ORDER — ASPIRIN 81 MG/1
324 TABLET, CHEWABLE ORAL ONCE
Status: COMPLETED | OUTPATIENT
Start: 2025-07-09 | End: 2025-07-09

## 2025-07-09 RX ORDER — NITROGLYCERIN 0.4 MG/1
0.4 TABLET SUBLINGUAL
Status: DISCONTINUED | OUTPATIENT
Start: 2025-07-09 | End: 2025-07-10 | Stop reason: HOSPADM

## 2025-07-09 RX ORDER — WARFARIN SODIUM 5 MG/1
5 TABLET ORAL TAKE AS DIRECTED
COMMUNITY

## 2025-07-09 RX ORDER — POLYETHYLENE GLYCOL 3350 17 G/17G
17 POWDER, FOR SOLUTION ORAL DAILY PRN
Status: DISCONTINUED | OUTPATIENT
Start: 2025-07-09 | End: 2025-07-10 | Stop reason: HOSPADM

## 2025-07-09 RX ORDER — SODIUM CHLORIDE 9 MG/ML
40 INJECTION, SOLUTION INTRAVENOUS AS NEEDED
Status: DISCONTINUED | OUTPATIENT
Start: 2025-07-09 | End: 2025-07-10 | Stop reason: HOSPADM

## 2025-07-09 RX ORDER — FUROSEMIDE 40 MG/1
40 TABLET ORAL DAILY
COMMUNITY
Start: 2025-05-01 | End: 2025-10-28

## 2025-07-09 RX ADMIN — CARVEDILOL 3.12 MG: 3.12 TABLET, FILM COATED ORAL at 22:08

## 2025-07-09 RX ADMIN — Medication 10 ML: at 20:49

## 2025-07-09 RX ADMIN — ASPIRIN 81 MG CHEWABLE TABLET 324 MG: 81 TABLET CHEWABLE at 16:58

## 2025-07-09 RX ADMIN — PANTOPRAZOLE SODIUM 40 MG: 40 TABLET, DELAYED RELEASE ORAL at 22:08

## 2025-07-09 NOTE — TELEPHONE ENCOUNTER
Caller: shaunna arreola    Relationship to patient: Emergency Contact    Best call back number: [phone]     Chief complaint: CP, HEADACHES, EDEMA    Type of visit: FU    Requested date: ASAP     If rescheduling, when is the original appointment: NA     Additional notes: PT WIFE CALLING TO SCHEDULE PT AN APPT AS PT IS STARTING TO HAVE SYMPTOMS THAT HAVE PROGRESSED OVER THE LAST WEEK - SHE STATES HE HAS SWELLING IN THE BOTTOM OF HIS LEG AND HE HAS ALSO BEEN HAVING CHEST PAINS - SHE STATES HE IS HAVING TERRIBLE HEADACHES AS WELL AND WOULD LIKE HIM TO COME IN AND GET CHECKED - SOONEST APPT WAS 09.02.25 - PLEASE ADVISE

## 2025-07-09 NOTE — PROGRESS NOTES
"    Subjective:     Encounter Date:07/09/2025      Patient ID: Rufino Vasques is a 80 y.o. male.    Chief Complaint:     History of Present Illness    Mr. Rufino Vasques has a PMH of      - CAD status post PCI (2013 details not available)   - history of Endocarditis (treated with 6 weeks IV abx)  - GERD/previous esophageal dilations  - Chronic back pain secondary to spinal stenosis ([facility])  - Back surgeries x 4, right eye surgery x 5, hip replacement, cholecystectomy  - Blindness in right eye, ptosis left eye, hard of hearing  - Family history of father with valvular disease.  - No known allergies  - Non-smoker      Here for an acute visit for chest pain.  Patient reports mid-sternal to left sided chest pain with radiation to his left shoulder and arm.  This has been occurring for a few months intermittently but has progressively worsened this week.  Patient reports that pain occurs at rest AFTER he has done activity and has rested for about 15 minutes.  He also started having increase in his blood pressure over the last week, as well as lower extremity edema, worse in the left leg.  Patient initially reports that he \"felt great today\" then developed a \"twinge\" of pain in his arm where he grimaced and then has a episode of chest discomfort.      Blood pressure in office 154/81 left arm 156/91 right arm HR 70 oxygen 99% on room air  Weight 244 lbs BMI 33       Patient had stress test in March 2024 that was negative for ischemia         Lab Review:     No recent labs.     The following portions of the patient's history were reviewed and updated as appropriate: allergies, current medications, past family history, past medical history, past social history, past surgical history, and problem list.      Review of Systems   Constitutional: Positive for malaise/fatigue.   Cardiovascular:  Positive for chest pain and leg swelling. Negative for dyspnea on exertion and palpitations.   Respiratory:  " "Negative for cough and shortness of breath.    Gastrointestinal:  Negative for abdominal pain, nausea and vomiting.   Neurological:  Negative for dizziness, headaches, light-headedness, numbness and weakness.   All other systems reviewed and are negative.    Past Medical History:   Diagnosis Date    Arthritis     Back pain     Blind right eye     CAD (coronary artery disease)     Esophagus disorder     several dilations    GERD (gastroesophageal reflux disease)     History of BPH     [place] (hard of hearing)     Lumbar stenosis with neurogenic claudication     Seasonal allergies      Past Surgical History:   Procedure Laterality Date    BACK SURGERY      x3    CHOLECYSTECTOMY      COLONOSCOPY W/ POLYPECTOMY      CORONARY ANGIOPLASTY WITH STENT PLACEMENT      2013    ESOPHAGEAL DILATATION      EYE SURGERY      SPINAL FUSION      TOTAL HIP ARTHROPLASTY       /91 (BP Location: Right arm, Patient Position: Sitting, Cuff Size: Adult)   Pulse 70   Ht 182.9 cm (72\")   Wt 111 kg (244 lb)   SpO2 99%   BMI 33.09 kg/m²   Family History   Problem Relation Age of Onset    Malig Hyperthermia Neg Hx        Current Outpatient Medications:     furosemide (LASIX) 40 MG tablet, Take 1 tablet by mouth Daily., Disp: , Rfl:     HYDROcodone-acetaminophen (NORCO)  MG per tablet, Take 1 tablet by mouth Every 6 (Six) Hours As Needed for Moderate Pain., Disp: , Rfl:     metoprolol succinate XL (Toprol XL) 25 MG 24 hr tablet, Take 1 tablet by mouth Daily for 360 days. (Patient taking differently: Take 1 tablet by mouth Daily As Needed.), Disp: 90 tablet, Rfl: 3    omeprazole (priLOSEC) 20 MG capsule, Take 1 capsule by mouth Daily., Disp: , Rfl:     predniSONE (DELTASONE) 50 MG tablet, Take 1 tablet by mouth Daily., Disp: , Rfl:     warfarin (COUMADIN) 5 MG tablet, Take 1 tablet by mouth Take As Directed., Disp: , Rfl:     aspirin 81 MG EC tablet, Take 1 tablet by mouth Daily. (Patient not taking: Reported on 7/9/2025), Disp: 90 " tablet, Rfl: 3    atorvastatin (LIPITOR) 10 MG tablet, Take 2 tablets by mouth Daily. (Patient not taking: Reported on 7/9/2025), Disp: 180 tablet, Rfl: 3  No Known Allergies  Social History     Socioeconomic History    Marital status:    Tobacco Use    Smoking status: Never     Passive exposure: Never    Smokeless tobacco: Never   Vaping Use    Vaping status: Never Used   Substance and Sexual Activity    Alcohol use: Not Currently    Drug use: Never    Sexual activity: Defer                Objective:     Vitals reviewed.   Constitutional:       Appearance: Normal appearance. Not in distress. Obese.   Neck:      Vascular: No JVR. JVD normal.   Pulmonary:      Effort: Pulmonary effort is normal.      Breath sounds: Decreased breath sounds present. No wheezing. No rhonchi. No rales.   Chest:      Chest wall: Not tender to palpatation.   Cardiovascular:      PMI at left midclavicular line. Normal rate. Irregularly irregular rhythm. Normal S1. Normal S2.       Murmurs: There is no murmur.      No gallop.  No click. No rub.   Pulses:     Intact distal pulses.   Edema:     Peripheral edema present.     Comments: Left lower extremity edema worse than right   Abdominal:      General: Bowel sounds are normal.      Palpations: Abdomen is soft.      Tenderness: There is no abdominal tenderness.   Musculoskeletal: Normal range of motion.         General: No tenderness. Skin:     General: Skin is warm and dry.   Neurological:      General: No focal deficit present.      Mental Status: Alert and oriented to person, place and time.   Psychiatric:         Behavior: Behavior is cooperative.         ECG 12 Lead    Date/Time: 7/9/2025 3:49 PM  Performed by: Debby Spencer APRN    Authorized by: Debby Spencer APRN  Comparison: compared with previous ECG from 3/26/2024  Similar to previous ECG  Rhythm: atrial fibrillation  Ectopy: infrequent PVCs  Rate: normal  BPM: 80  Other findings: non-specific ST-T wave changes                         Assessment:     [facility]       Diagnosis Plan   1. Unstable angina        2. Persistent atrial fibrillation        3. Essential hypertension        4. CAD S/P percutaneous coronary angioplasty                       Plan:   Chart reviewed   No labs available   Previous stress test from March 2024 negative for ischemia   EKG with Afib rate controlled     Patient is having progressively worsening symptoms of chest pain and now lower extremity edema.  Left worse than right.   Recommended evaluation in the ED for unstable angina   Recommend Troponin, D-dimer, BNP, CMP, CBC     Called and discussed with ED provider Dr. Hurst.     Wife agreeable to take patient to the ED for evaluation.     Electronically signed by CAMILLA Brennan, 07/09/25, 4:49 PM EDT.          This document is intended for medical expert use only.  Reading of this document by patients and/or patient's family without participating medical staff guidance may result in misinterpretation and unintended morbidity. Any interpretation of such data is the responsibility of the patient and/or family member responsible for the patient in concert with their primary or specialist providers, not to be left for sources of online search as such as [employer], [employer] or similar queries.  Relying on these approaches to knowledge may result in misinterpretation, misguided goals of care and even death should patient or family members try recommendations outside of the realm of professional medical care in a supervised inpatient environment.

## 2025-07-09 NOTE — ED PROVIDER NOTES
"Subjective   History of Present Illness  80-year-old male presents for intermittent chest pain going on for weeks.  Seems to be worse when at rest.  Has began having some increasing bilateral lower extremity swelling.  Seen by cardiologist and recommended to come the emergency department for further workup and evaluation as there is somewhat concern for unstable angina.  Not having chest pain currently.  Substernal and nonradiating.  Review of Systems  See HPI.  Past Medical History:   Diagnosis Date    Arthritis     Back pain     Blind right eye     CAD (coronary artery disease)     Esophagus disorder     several dilations    GERD (gastroesophageal reflux disease)     History of BPH     [place] (hard of hearing)     Lumbar stenosis with neurogenic claudication     Seasonal allergies        No Known Allergies    Past Surgical History:   Procedure Laterality Date    BACK SURGERY      x3    CHOLECYSTECTOMY      COLONOSCOPY W/ POLYPECTOMY      CORONARY ANGIOPLASTY WITH STENT PLACEMENT      2013    ESOPHAGEAL DILATATION      EYE SURGERY      SPINAL FUSION      TOTAL HIP ARTHROPLASTY         Family History   Problem Relation Age of Onset    Malig Hyperthermia Neg Hx        Social History     Socioeconomic History    Marital status:    Tobacco Use    Smoking status: Never     Passive exposure: Never    Smokeless tobacco: Never   Vaping Use    Vaping status: Never Used   Substance and Sexual Activity    Alcohol use: Not Currently    Drug use: Never    Sexual activity: Defer           Objective   Physical Exam  No acute distress, bilateral lower extremity edema present, regular rate and rhythm, alert, normal speech, mildly diminished breath sounds bilaterally, no tachypnea or increased work of breathing, NCAT, normal conjunctiva.  Procedures           ED Course      /88 (BP Location: Right arm, Patient Position: Lying)   Pulse 73   Temp 98.2 °F (36.8 °C) (Oral)   Resp 10   Ht 182.9 cm (72\")   Wt 110 kg (243 lb " 6.4 oz)   SpO2 100%   BMI 33.01 kg/m²   Labs Reviewed   COMPREHENSIVE METABOLIC PANEL - Abnormal; Notable for the following components:       Result Value    Glucose 110 (*)     All other components within normal limits    Narrative:     GFR Categories in Chronic Kidney Disease (CKD)              GFR Category          GFR (mL/min/1.73)    Interpretation  G1                    90 or greater        Normal or high (1)  G2                    60-89                Mild decrease (1)  G3a                   45-59                Mild to moderate decrease  G3b                   30-44                Moderate to severe decrease  G4                    15-29                Severe decrease  G5                    14 or less           Kidney failure    (1)In the absence of evidence of kidney disease, neither GFR category G1 or G2 fulfill the criteria for CKD.    eGFR calculation 2021 CKD-EPI creatinine equation, which does not include race as a factor   TROPONIN - Abnormal; Notable for the following components:    HS Troponin T 35 (*)     All other components within normal limits    Narrative:     High Sensitive Troponin T Reference Range:  <14.0 ng/L- Negative Female for AMI  <22.0 ng/L- Negative Male for AMI  >=14 - Abnormal Female indicating possible myocardial injury.  >=22 - Abnormal Male indicating possible myocardial injury.   Clinicians would have to utilize clinical acumen, EKG, Troponin, and serial changes to determine if it is an Acute Myocardial Infarction or myocardial injury due to an underlying chronic condition.        BNP (IN-HOUSE) - Abnormal; Notable for the following components:    proBNP 2,160.0 (*)     All other components within normal limits    Narrative:     This assay is used as an aid in the diagnosis of individuals suspected of having heart failure. It can be used as an aid in the diagnosis of acute decompensated heart failure (ADHF) in patients presenting with signs and symptoms of ADHF to the emergency  department (ED). In addition, NT-proBNP of <300 pg/mL indicates ADHF is not likely.    Age Range Result Interpretation  NT-proBNP Concentration (pg/mL:      <50             Positive            >450                   Gray                 300-450                    Negative             <300    50-75           Positive            >900                  Gray                300-900                  Negative            <300      >75             Positive            >1800                  Gray                300-1800                  Negative            <300   PROTIME-INR - Abnormal; Notable for the following components:    Protime 39.4 (*)     INR 3.96 (*)     All other components within normal limits   CBC WITH AUTO DIFFERENTIAL - Abnormal; Notable for the following components:    Lymphocyte % 15.5 (*)     Neutrophils, Absolute 7.73 (*)     All other components within normal limits   HIGH SENSITIVITIY TROPONIN T 1HR - Abnormal; Notable for the following components:    HS Troponin T 34 (*)     All other components within normal limits    Narrative:     High Sensitive Troponin T Reference Range:  <14.0 ng/L- Negative Female for AMI  <22.0 ng/L- Negative Male for AMI  >=14 - Abnormal Female indicating possible myocardial injury.  >=22 - Abnormal Male indicating possible myocardial injury.   Clinicians would have to utilize clinical acumen, EKG, Troponin, and serial changes to determine if it is an Acute Myocardial Infarction or myocardial injury due to an underlying chronic condition.        CBC AND DIFFERENTIAL    Narrative:     The following orders were created for panel order CBC & Differential.  Procedure                               Abnormality         Status                     ---------                               -----------         ------                     CBC Auto Differential[[id number]]        Abnormal            Final result                 Please view results for these tests on the individual orders.   EXTRA  TUBES    Narrative:     The following orders were created for panel order Extra Tubes.  Procedure                               Abnormality         Status                     ---------                               -----------         ------                     Gold Top - [facility][[id number]]                                   Final result                 Please view results for these tests on the individual orders.   GOLD TOP - [facility]     Medications   sodium chloride 0.9 % flush 10 mL (has no administration in time range)   sodium chloride 0.9 % flush 10 mL (10 mL Intravenous Given 7/9/25 2049)   sodium chloride 0.9 % flush 10 mL (has no administration in time range)   sodium chloride 0.9 % infusion 40 mL (has no administration in time range)   nitroglycerin (NITROSTAT) SL tablet 0.4 mg (has no administration in time range)   Potassium Replacement - Follow Nurse / BPA Driven Protocol (has no administration in time range)   Magnesium Standard Dose Replacement - Follow Nurse / BPA Driven Protocol (has no administration in time range)   Phosphorus Replacement - Follow Nurse / BPA Driven Protocol (has no administration in time range)   Calcium Replacement - Follow Nurse / BPA Driven Protocol (has no administration in time range)   sennosides-docusate (PERICOLACE) 8.6-50 MG per tablet 2 tablet (has no administration in time range)     And   polyethylene glycol (MIRALAX) packet 17 g (has no administration in time range)     And   bisacodyl (DULCOLAX) EC tablet 5 mg (has no administration in time range)     And   bisacodyl (DULCOLAX) suppository 10 mg (has no administration in time range)   carvedilol (COREG) tablet 3.125 mg (3.125 mg Oral Given 7/9/25 2208)   HYDROcodone-acetaminophen (NORCO)  MG per tablet 1 tablet (has no administration in time range)   pantoprazole (PROTONIX) EC tablet 40 mg (40 mg Oral Given 7/9/25 2208)   aspirin chewable tablet 324 mg (324 mg Oral Given 7/9/25 1658)     XR Chest 1 View   Final Result    Impression:   No radiographic evidence of acute cardiopulmonary abnormality.            Electronically Signed: Hussein Jean MD     7/9/2025 6:00 PM EDT     Workstation ID: [id number]                [id number] Score: 4                                          Medical Decision Making  Amount and/or Complexity of Data Reviewed  Labs: ordered.  Radiology: ordered.  ECG/medicine tests: ordered.    Risk  OTC drugs.  Prescription drug management.  Decision regarding hospitalization.    Interpretation chest x-ray is negative for pneumothorax.  See system for radiology interpretation.    EKG interpretation: Rate 75, A-fib, poor R wave progression, left axis deviation, no STEMI.    Patient with supratherapeutic INR.  Low suspicion for PE.  Given aspirin here.  Nontoxic-appearing.  Given history and concern for unstable angina will likely get cath cardiology.  Admitted to hospitalist service.    Final diagnoses:   Chest pain, unspecified type       ED Disposition  ED Disposition       ED Disposition   Decision to Admit    Condition   --    Comment   Level of Care: Med/Surg [1]   Diagnosis: Chest pain [949635]   Admitting Physician: MAR NIETO [[id number]]   Certification: I Certify That Inpatient Hospital Services Are Medically Necessary For Greater Than 2 Midnights                 No follow-up provider specified.       Medication List      No changes were made to your prescriptions during this visit.            Jose Reyes MD  07/10/25 0001

## 2025-07-10 ENCOUNTER — APPOINTMENT (OUTPATIENT)
Dept: NUCLEAR MEDICINE | Facility: HOSPITAL | Age: 80
End: 2025-07-10
Payer: MEDICARE

## 2025-07-10 ENCOUNTER — APPOINTMENT (OUTPATIENT)
Dept: CARDIOLOGY | Facility: HOSPITAL | Age: 80
End: 2025-07-10
Payer: MEDICARE

## 2025-07-10 VITALS
OXYGEN SATURATION: 97 % | WEIGHT: 243 LBS | SYSTOLIC BLOOD PRESSURE: 159 MMHG | TEMPERATURE: 97.2 F | HEART RATE: 70 BPM | RESPIRATION RATE: 17 BRPM | DIASTOLIC BLOOD PRESSURE: 69 MMHG | HEIGHT: 72 IN | BODY MASS INDEX: 32.91 KG/M2

## 2025-07-10 LAB
ALBUMIN SERPL-MCNC: 3.8 G/DL (ref 3.5–5.2)
ALBUMIN/GLOB SERPL: 2.1 G/DL
ALP SERPL-CCNC: 46 U/L (ref 39–117)
ALT SERPL W P-5'-P-CCNC: 11 U/L (ref 1–41)
ANION GAP SERPL CALCULATED.3IONS-SCNC: 10.7 MMOL/L (ref 5–15)
AORTIC DIMENSIONLESS INDEX: 0.51 (DI)
AST SERPL-CCNC: 13 U/L (ref 1–40)
AV MEAN PRESS GRAD SYS DOP V1V2: 4 MMHG
AV VMAX SYS DOP: 146 CM/SEC
BASOPHILS # BLD AUTO: 0.02 10*3/MM3 (ref 0–0.2)
BASOPHILS NFR BLD AUTO: 0.3 % (ref 0–1.5)
BH CV ECHO MEAS - AO MAX PG: 8.5 MMHG
BH CV ECHO MEAS - AO V2 VTI: 32.8 CM
BH CV ECHO MEAS - AVA(I,D): 1.95 CM2
BH CV ECHO MEAS - EDV(CUBED): 157.5 ML
BH CV ECHO MEAS - EDV(MOD-SP4): 120 ML
BH CV ECHO MEAS - EF(MOD-SP4): 49.5 %
BH CV ECHO MEAS - ESV(CUBED): 64 ML
BH CV ECHO MEAS - ESV(MOD-SP4): 60.6 ML
BH CV ECHO MEAS - FS: 25.9 %
BH CV ECHO MEAS - IVS/LVPW: 1.11 CM
BH CV ECHO MEAS - IVSD: 1 CM
BH CV ECHO MEAS - LA DIMENSION: 4.8 CM
BH CV ECHO MEAS - LAT PEAK E' VEL: 12 CM/SEC
BH CV ECHO MEAS - LV MASS(C)D: 193.3 GRAMS
BH CV ECHO MEAS - LV MAX PG: 3 MMHG
BH CV ECHO MEAS - LV MEAN PG: 2 MMHG
BH CV ECHO MEAS - LV V1 MAX: 87 CM/SEC
BH CV ECHO MEAS - LV V1 VTI: 16.8 CM
BH CV ECHO MEAS - LVIDD: 5.4 CM
BH CV ECHO MEAS - LVIDS: 4 CM
BH CV ECHO MEAS - LVOT AREA: 3.8 CM2
BH CV ECHO MEAS - LVOT DIAM: 2.2 CM
BH CV ECHO MEAS - LVPWD: 0.9 CM
BH CV ECHO MEAS - MED PEAK E' VEL: 11 CM/SEC
BH CV ECHO MEAS - MV A MAX VEL: 30.4 CM/SEC
BH CV ECHO MEAS - MV DEC SLOPE: 259 CM/SEC2
BH CV ECHO MEAS - MV DEC TIME: 0.19 SEC
BH CV ECHO MEAS - MV E MAX VEL: 79.1 CM/SEC
BH CV ECHO MEAS - MV E/A: 2.6
BH CV ECHO MEAS - MV MAX PG: 4.2 MMHG
BH CV ECHO MEAS - MV MEAN PG: 2 MMHG
BH CV ECHO MEAS - MV P1/2T: 121 MSEC
BH CV ECHO MEAS - MV V2 VTI: 31 CM
BH CV ECHO MEAS - MVA(P1/2T): 1.82 CM2
BH CV ECHO MEAS - MVA(VTI): 2.06 CM2
BH CV ECHO MEAS - PA ACC TIME: 0.1 SEC
BH CV ECHO MEAS - PA V2 MAX: 82.7 CM/SEC
BH CV ECHO MEAS - RAP SYSTOLE: 8 MMHG
BH CV ECHO MEAS - RV MAX PG: 1.18 MMHG
BH CV ECHO MEAS - RV V1 MAX: 54.3 CM/SEC
BH CV ECHO MEAS - RV V1 VTI: 10.7 CM
BH CV ECHO MEAS - RVDD: 4.3 CM
BH CV ECHO MEAS - SV(LVOT): 63.9 ML
BH CV ECHO MEAS - SV(MOD-SP4): 59.4 ML
BH CV ECHO MEAS - TAPSE (>1.6): 1.41 CM
BH CV ECHO MEASUREMENTS AVERAGE E/E' RATIO: 6.88
BH CV REST NUCLEAR ISOTOPE DOSE: 11 MCI
BH CV STRESS BP STAGE 1: NORMAL
BH CV STRESS BP STAGE 2: NORMAL
BH CV STRESS COMMENTS STAGE 1: NORMAL
BH CV STRESS COMMENTS STAGE 2: NORMAL
BH CV STRESS DOSE REGADENOSON STAGE 1: 0.4
BH CV STRESS DURATION MIN STAGE 1: 0
BH CV STRESS DURATION MIN STAGE 2: 4
BH CV STRESS DURATION SEC STAGE 1: 10
BH CV STRESS DURATION SEC STAGE 2: 0
BH CV STRESS HR STAGE 1: 56
BH CV STRESS HR STAGE 2: 75
BH CV STRESS NUCLEAR ISOTOPE DOSE: 32.1 MCI
BH CV STRESS PROTOCOL 1: NORMAL
BH CV STRESS RECOVERY BP: NORMAL MMHG
BH CV STRESS RECOVERY HR: 67 BPM
BH CV STRESS STAGE 1: 1
BH CV STRESS STAGE 2: 2
BH CV XLRA - TDI S': 15.1 CM/SEC
BILIRUB SERPL-MCNC: 0.5 MG/DL (ref 0–1.2)
BUN SERPL-MCNC: 14.3 MG/DL (ref 8–23)
BUN/CREAT SERPL: 14.7 (ref 7–25)
CALCIUM SPEC-SCNC: 9.1 MG/DL (ref 8.6–10.5)
CHLORIDE SERPL-SCNC: 109 MMOL/L (ref 98–107)
CHOLEST SERPL-MCNC: 178 MG/DL (ref 0–200)
CO2 SERPL-SCNC: 24.3 MMOL/L (ref 22–29)
CREAT SERPL-MCNC: 0.97 MG/DL (ref 0.76–1.27)
DEPRECATED RDW RBC AUTO: 42.6 FL (ref 37–54)
EGFRCR SERPLBLD CKD-EPI 2021: 77.9 ML/MIN/1.73
EOSINOPHIL # BLD AUTO: 0.1 10*3/MM3 (ref 0–0.4)
EOSINOPHIL NFR BLD AUTO: 1.4 % (ref 0.3–6.2)
ERYTHROCYTE [DISTWIDTH] IN BLOOD BY AUTOMATED COUNT: 12.7 % (ref 12.3–15.4)
GLOBULIN UR ELPH-MCNC: 1.8 GM/DL
GLUCOSE SERPL-MCNC: 126 MG/DL (ref 65–99)
HBA1C MFR BLD: 5.28 % (ref 4.8–5.6)
HCT VFR BLD AUTO: 37.6 % (ref 37.5–51)
HDLC SERPL-MCNC: 56 MG/DL (ref 40–60)
HGB BLD-MCNC: 12.5 G/DL (ref 13–17.7)
IMM GRANULOCYTES # BLD AUTO: 0.02 10*3/MM3 (ref 0–0.05)
IMM GRANULOCYTES NFR BLD AUTO: 0.3 % (ref 0–0.5)
INR PPP: 3.78 (ref 2–3)
LDLC SERPL CALC-MCNC: 104 MG/DL (ref 0–100)
LDLC/HDLC SERPL: 1.82 {RATIO}
LV EF 3D SEGMENTATION: 49 %
LYMPHOCYTES # BLD AUTO: 2.06 10*3/MM3 (ref 0.7–3.1)
LYMPHOCYTES NFR BLD AUTO: 28.8 % (ref 19.6–45.3)
MAGNESIUM SERPL-MCNC: 2.2 MG/DL (ref 1.6–2.4)
MAXIMAL PREDICTED HEART RATE: 138 BPM
MCH RBC QN AUTO: 30.5 PG (ref 26.6–33)
MCHC RBC AUTO-ENTMCNC: 33.2 G/DL (ref 31.5–35.7)
MCV RBC AUTO: 91.7 FL (ref 79–97)
MONOCYTES # BLD AUTO: 0.69 10*3/MM3 (ref 0.1–0.9)
MONOCYTES NFR BLD AUTO: 9.6 % (ref 5–12)
NEUTROPHILS NFR BLD AUTO: 4.27 10*3/MM3 (ref 1.7–7)
NEUTROPHILS NFR BLD AUTO: 59.6 % (ref 42.7–76)
NRBC BLD AUTO-RTO: 0 /100 WBC (ref 0–0.2)
PERCENT MAX PREDICTED HR: 54.35 %
PHOSPHATE SERPL-MCNC: 2.5 MG/DL (ref 2.5–4.5)
PLATELET # BLD AUTO: 191 10*3/MM3 (ref 140–450)
PMV BLD AUTO: 10.4 FL (ref 6–12)
POTASSIUM SERPL-SCNC: 4 MMOL/L (ref 3.5–5.2)
PROT SERPL-MCNC: 5.6 G/DL (ref 6–8.5)
PROTHROMBIN TIME: 38 SECONDS (ref 19.4–28.5)
QT INTERVAL: 417 MS
QTC INTERVAL: 465 MS
RBC # BLD AUTO: 4.1 10*6/MM3 (ref 4.14–5.8)
SINUS: 3.4 CM
SODIUM SERPL-SCNC: 144 MMOL/L (ref 136–145)
SPECT HRT GATED+EF W RNC IV: 56 %
STJ: 2.9 CM
STRESS BASELINE BP: NORMAL MMHG
STRESS BASELINE HR: 56 BPM
STRESS PERCENT HR: 64 %
STRESS POST PEAK BP: NORMAL MMHG
STRESS POST PEAK HR: 75 BPM
STRESS TARGET HR: 117 BPM
T4 FREE SERPL-MCNC: 1.41 NG/DL (ref 0.92–1.68)
TRIGL SERPL-MCNC: 101 MG/DL (ref 0–150)
TSH SERPL DL<=0.05 MIU/L-ACNC: 0.29 UIU/ML (ref 0.27–4.2)
VLDLC SERPL-MCNC: 18 MG/DL (ref 5–40)
WBC NRBC COR # BLD AUTO: 7.16 10*3/MM3 (ref 3.4–10.8)

## 2025-07-10 PROCEDURE — 85610 PROTHROMBIN TIME: CPT | Performed by: STUDENT IN AN ORGANIZED HEALTH CARE EDUCATION/TRAINING PROGRAM

## 2025-07-10 PROCEDURE — 80053 COMPREHEN METABOLIC PANEL: CPT | Performed by: STUDENT IN AN ORGANIZED HEALTH CARE EDUCATION/TRAINING PROGRAM

## 2025-07-10 PROCEDURE — 99222 1ST HOSP IP/OBS MODERATE 55: CPT | Performed by: INTERNAL MEDICINE

## 2025-07-10 PROCEDURE — 25010000002 FUROSEMIDE PER 20 MG: Performed by: STUDENT IN AN ORGANIZED HEALTH CARE EDUCATION/TRAINING PROGRAM

## 2025-07-10 PROCEDURE — 93306 TTE W/DOPPLER COMPLETE: CPT

## 2025-07-10 PROCEDURE — 85025 COMPLETE CBC W/AUTO DIFF WBC: CPT | Performed by: STUDENT IN AN ORGANIZED HEALTH CARE EDUCATION/TRAINING PROGRAM

## 2025-07-10 PROCEDURE — 84100 ASSAY OF PHOSPHORUS: CPT | Performed by: STUDENT IN AN ORGANIZED HEALTH CARE EDUCATION/TRAINING PROGRAM

## 2025-07-10 PROCEDURE — 80061 LIPID PANEL: CPT | Performed by: STUDENT IN AN ORGANIZED HEALTH CARE EDUCATION/TRAINING PROGRAM

## 2025-07-10 PROCEDURE — 25010000002 REGADENOSON 0.4 MG/5ML SOLUTION: Performed by: FAMILY MEDICINE

## 2025-07-10 PROCEDURE — A9502 TC99M TETROFOSMIN: HCPCS | Performed by: FAMILY MEDICINE

## 2025-07-10 PROCEDURE — 25010000003 LABETALOL 5 MG/ML SOLUTION: Performed by: STUDENT IN AN ORGANIZED HEALTH CARE EDUCATION/TRAINING PROGRAM

## 2025-07-10 PROCEDURE — 83735 ASSAY OF MAGNESIUM: CPT | Performed by: STUDENT IN AN ORGANIZED HEALTH CARE EDUCATION/TRAINING PROGRAM

## 2025-07-10 PROCEDURE — 78452 HT MUSCLE IMAGE SPECT MULT: CPT

## 2025-07-10 PROCEDURE — 25010000002 SULFUR HEXAFLUORIDE MICROSPH 60.7-25 MG RECONSTITUTED SUSPENSION: Performed by: FAMILY MEDICINE

## 2025-07-10 PROCEDURE — 93017 CV STRESS TEST TRACING ONLY: CPT

## 2025-07-10 PROCEDURE — 84439 ASSAY OF FREE THYROXINE: CPT | Performed by: STUDENT IN AN ORGANIZED HEALTH CARE EDUCATION/TRAINING PROGRAM

## 2025-07-10 PROCEDURE — 78452 HT MUSCLE IMAGE SPECT MULT: CPT | Performed by: INTERNAL MEDICINE

## 2025-07-10 PROCEDURE — 93018 CV STRESS TEST I&R ONLY: CPT | Performed by: INTERNAL MEDICINE

## 2025-07-10 PROCEDURE — 34310000005 TECHNETIUM TETROFOSMIN KIT: Performed by: FAMILY MEDICINE

## 2025-07-10 PROCEDURE — 93306 TTE W/DOPPLER COMPLETE: CPT | Performed by: INTERNAL MEDICINE

## 2025-07-10 RX ORDER — ASPIRIN 81 MG/1
81 TABLET ORAL DAILY
Qty: 90 TABLET | Refills: 0 | Status: SHIPPED | OUTPATIENT
Start: 2025-07-10

## 2025-07-10 RX ORDER — FUROSEMIDE 10 MG/ML
40 INJECTION INTRAMUSCULAR; INTRAVENOUS ONCE
Status: COMPLETED | OUTPATIENT
Start: 2025-07-10 | End: 2025-07-10

## 2025-07-10 RX ORDER — LABETALOL HYDROCHLORIDE 5 MG/ML
10 INJECTION, SOLUTION INTRAVENOUS ONCE AS NEEDED
Status: COMPLETED | OUTPATIENT
Start: 2025-07-10 | End: 2025-07-10

## 2025-07-10 RX ORDER — CARVEDILOL 6.25 MG/1
6.25 TABLET ORAL 2 TIMES DAILY WITH MEALS
Status: DISCONTINUED | OUTPATIENT
Start: 2025-07-10 | End: 2025-07-10 | Stop reason: HOSPADM

## 2025-07-10 RX ORDER — ACETAMINOPHEN 325 MG/1
650 TABLET ORAL EVERY 6 HOURS PRN
Status: DISCONTINUED | OUTPATIENT
Start: 2025-07-10 | End: 2025-07-10 | Stop reason: HOSPADM

## 2025-07-10 RX ORDER — ATORVASTATIN CALCIUM 40 MG/1
40 TABLET, FILM COATED ORAL NIGHTLY
Status: DISCONTINUED | OUTPATIENT
Start: 2025-07-10 | End: 2025-07-10

## 2025-07-10 RX ORDER — ATORVASTATIN CALCIUM 40 MG/1
40 TABLET, FILM COATED ORAL NIGHTLY
Qty: 90 TABLET | Refills: 0 | Status: SHIPPED | OUTPATIENT
Start: 2025-07-10

## 2025-07-10 RX ORDER — REGADENOSON 0.08 MG/ML
0.4 INJECTION, SOLUTION INTRAVENOUS
Status: COMPLETED | OUTPATIENT
Start: 2025-07-10 | End: 2025-07-10

## 2025-07-10 RX ORDER — CARVEDILOL 6.25 MG/1
6.25 TABLET ORAL 2 TIMES DAILY WITH MEALS
Qty: 60 TABLET | Refills: 0 | Status: SHIPPED | OUTPATIENT
Start: 2025-07-10

## 2025-07-10 RX ORDER — ATORVASTATIN CALCIUM 40 MG/1
40 TABLET, FILM COATED ORAL NIGHTLY
Status: DISCONTINUED | OUTPATIENT
Start: 2025-07-10 | End: 2025-07-10 | Stop reason: HOSPADM

## 2025-07-10 RX ORDER — ASPIRIN 81 MG/1
81 TABLET, CHEWABLE ORAL DAILY
Status: DISCONTINUED | OUTPATIENT
Start: 2025-07-10 | End: 2025-07-10 | Stop reason: HOSPADM

## 2025-07-10 RX ADMIN — REGADENOSON 0.4 MG: 0.08 INJECTION, SOLUTION INTRAVENOUS at 12:31

## 2025-07-10 RX ADMIN — TETROFOSMIN 1 DOSE: 1.38 INJECTION, POWDER, LYOPHILIZED, FOR SOLUTION INTRAVENOUS at 12:31

## 2025-07-10 RX ADMIN — FUROSEMIDE 40 MG: 10 INJECTION, SOLUTION INTRAMUSCULAR; INTRAVENOUS at 03:24

## 2025-07-10 RX ADMIN — ACETAMINOPHEN 650 MG: 325 TABLET, FILM COATED ORAL at 14:33

## 2025-07-10 RX ADMIN — PANTOPRAZOLE SODIUM 40 MG: 40 TABLET, DELAYED RELEASE ORAL at 05:25

## 2025-07-10 RX ADMIN — TETROFOSMIN 1 DOSE: 1.38 INJECTION, POWDER, LYOPHILIZED, FOR SOLUTION INTRAVENOUS at 10:50

## 2025-07-10 RX ADMIN — LABETALOL 20 MG/4 ML (5 MG/ML) INTRAVENOUS SYRINGE 10 MG: at 03:38

## 2025-07-10 RX ADMIN — CARVEDILOL 6.25 MG: 6.25 TABLET, FILM COATED ORAL at 09:31

## 2025-07-10 RX ADMIN — Medication 10 ML: at 09:38

## 2025-07-10 RX ADMIN — ASPIRIN 81 MG CHEWABLE TABLET 81 MG: 81 TABLET CHEWABLE at 09:31

## 2025-07-10 RX ADMIN — SULFUR HEXAFLUORIDE 2 ML: KIT at 11:48

## 2025-07-10 NOTE — CASE MANAGEMENT/SOCIAL WORK
Discharge Planning Assessment  [facility]     Patient Name: Rufino Vasques  MRN: [id number]  Today's Date: 7/10/2025    Admit Date: 7/9/2025    Plan: Routine home with spouse.   Discharge Needs Assessment       Row Name 07/10/25 1326       Living Environment    People in Home spouse    Name(s) of People in Home Wife-Amy    Current Living Arrangements home    Potentially Unsafe Housing Conditions none    In the past 12 months has the electric, gas, oil, or water company threatened to shut off services in your home? No    Primary Care Provided by self    Provides Primary Care For no one    Family Caregiver if Needed spouse    Family Caregiver Names Amy    Quality of Family Relationships helpful;involved;supportive    Able to Return to Prior Arrangements yes       Resource/Environmental Concerns    Resource/Environmental Concerns none    Transportation Concerns none       Transportation Needs    In the past 12 months, has lack of transportation kept you from medical appointments or from getting medications? no    In the past 12 months, has lack of transportation kept you from meetings, work, or from getting things needed for daily living? No       Food Insecurity    Within the past 12 months, you worried that your food would run out before you got the money to buy more. Never true    Within the past 12 months, the food you bought just didn't last and you didn't have money to get more. Never true       Transition Planning    Patient/Family Anticipates Transition to home;home with family    Patient/Family Anticipated Services at Transition none    Transportation Anticipated car, drives self;family or friend will provide       Discharge Needs Assessment    Readmission Within the Last 30 Days no previous admission in last 30 days    Equipment Currently Used at Home bp cuff;cane, straight    Concerns to be Addressed denies needs/concerns at this time;no discharge needs identified    Do you want help finding or keeping work  or a job? Patient declined    Do you want help with school or training? For example, starting or completing job training or getting a high school diploma, GED or equivalent No    Anticipated Changes Related to Illness none    Equipment Needed After Discharge none    Provided Post Acute Provider List? N/A    Provided Post Acute Provider Quality & Resource List? N/A                   Discharge Plan       Row Name 07/10/25 5919       Plan    Plan Routine home with spouse.    Patient/Family in Agreement with Plan yes    Plan Comments CM met with wife Amy at bedside. Pt SYDNIE for testing. CM completed assessment with wife. Pt lives at home, drives, independent with ADL's. PCP and pharmacy confirmed ([facility] in [place]) - declined Meds 2 Beds. DME includes cane PRN and BP cuff. Wife denies current HHC/therapy services. She will transport home at time of dc.              Demographic Summary       Row Name 07/10/25 1320       General Information    Admission Type inpatient    Arrived From emergency department    Referral Source admission list    Reason for Consult care coordination/care conference;discharge planning    Preferred Language English       Contact Information    Permission Granted to Share Info With                    Functional Status       Row Name 07/10/25 1326       Functional Status    Usual Activity Tolerance good    Current Activity Tolerance good       Functional Status, IADL    Medications independent    Meal Preparation independent    Housekeeping independent    Laundry independent    Shopping independent    If for any reason you need help with day-to-day activities such as bathing, preparing meals, shopping, managing finances, etc., do you get the help you need? I don't need any help             Megan Naegele, RN     Office Phone: [phone]  Office Cell: [phone]

## 2025-07-10 NOTE — H&P
[facility] Medicine Services  History & Physical    Patient Name: Rufino Vasques  : 1945  MRN: [id number]  Primary Care Physician:  Sybil Fleming MD  Date of admission: 2025  Date and Time of Service: 2025 at 2049    Subjective      Chief Complaint: Chest pain    History of Present Illness: Rufino Vasques is a 80 y.o. male with a CMH of CAD status post stent x 1, A-fib on warfarin, hypertension, hyperlipidemia, GERD, arthritis, who presented to [facility] on 2025 with chest pain. Patient is awake, alert, oriented x3, and conversational. He reports intermittent retrosternal chest pain without clear triggers, and no associated dyspnea, exertional symptoms, or PND. Seen by outpatient cardiologist earlier today who recommended hospital evaluation. Denies fever, chills, or dysuria. Reports bilateral lower extremity edema not taking diuretics and or takes aspirin at home.    In the ED, vital stable, blood pressure 150s over 80s, troponins 35 with repeat 34, with no significant delta, EKG showing A-fib with possible left anterior fascicular block,, proBNP 2160, CMP mostly unremarkable, glucose 110, INR of 3.96, CBC showing no leukocytosis, patient possible aspirin once, admitted to medicine team for further inpatient management.      Review of Systems negative unless mentioned above.    Personal History     Past Medical History:   Diagnosis Date    Arthritis     Back pain     Blind right eye     CAD (coronary artery disease)     Esophagus disorder     several dilations    GERD (gastroesophageal reflux disease)     History of BPH     [place] (hard of hearing)     Lumbar stenosis with neurogenic claudication     Seasonal allergies        Past Surgical History:   Procedure Laterality Date    BACK SURGERY      x3    CHOLECYSTECTOMY      COLONOSCOPY W/ POLYPECTOMY      CORONARY ANGIOPLASTY WITH STENT PLACEMENT      2013    ESOPHAGEAL DILATATION      EYE SURGERY      SPINAL FUSION       TOTAL HIP ARTHROPLASTY         Family History: family history is not on file. Otherwise pertinent FHx was reviewed and not pertinent to current issue.    Social History:  reports that he has never smoked. He has never been exposed to tobacco smoke. He has never used smokeless tobacco. He reports that he does not currently use alcohol. He reports that he does not use drugs.    Home Medications:  Prior to Admission Medications       Prescriptions Last Dose Informant Patient Reported? Taking?    aspirin 81 MG EC tablet   No No    Take 1 tablet by mouth Daily.    Patient not taking:  Reported on 7/9/2025    atorvastatin (LIPITOR) 10 MG tablet   No No    Take 2 tablets by mouth Daily.    Patient not taking:  Reported on 7/9/2025    furosemide (LASIX) 40 MG tablet   Yes No    Take 1 tablet by mouth Daily.    HYDROcodone-acetaminophen (NORCO)  MG per tablet   Yes No    Take 1 tablet by mouth Every 6 (Six) Hours As Needed for Moderate Pain.    metoprolol succinate XL (Toprol XL) 25 MG 24 hr tablet   No No    Take 1 tablet by mouth Daily for 360 days.    Patient taking differently:  Take 1 tablet by mouth Daily As Needed.    omeprazole (priLOSEC) 20 MG capsule   Yes No    Take 1 capsule by mouth Daily.    predniSONE (DELTASONE) 50 MG tablet   Yes No    Take 1 tablet by mouth Daily.    warfarin (COUMADIN) 5 MG tablet   Yes No    Take 1 tablet by mouth Take As Directed.              Allergies:  No Known Allergies    Objective      Vitals:   Temp:  [97.9 °F (36.6 °C)] 97.9 °F (36.6 °C)  Heart Rate:  [61-71] 66  Resp:  [18-19] 19  BP: (154-187)/() 162/82  Body mass index is 33.01 kg/m².  Physical Exam  General: Awake alert [place] x 3, conversational  HEENT, atraumatic normocephalic  Cardio: Heart rate normal, rhythm irregular  Respiratory: Clear to auscultation on my exam  Abdomen, soft, nontender, no rebound or guarding  Extremities: Edema present on the bilateral lower extremities  Neuro: Awake alert [place] x 3,  normal speech, moves all extremities    Diagnostic Data:  Lab Results (last 24 hours)       Procedure Component Value Units Date/Time    High Sensitivity Troponin T 1Hr [[id number]]  (Abnormal) Collected: 07/09/25 1827    Specimen: Blood Updated: 07/09/25 1852     HS Troponin T 34 ng/L      Troponin T Numeric Delta -1 ng/L      Troponin T % Delta -3    Narrative:      High Sensitive Troponin T Reference Range:  <14.0 ng/L- Negative Female for AMI  <22.0 ng/L- Negative Male for AMI  >=14 - Abnormal Female indicating possible myocardial injury.  >=22 - Abnormal Male indicating possible myocardial injury.   Clinicians would have to utilize clinical acumen, EKG, Troponin, and serial changes to determine if it is an Acute Myocardial Infarction or myocardial injury due to an underlying chronic condition.         Comprehensive Metabolic Panel [[id number]]  (Abnormal) Collected: 07/09/25 1656    Specimen: Blood Updated: 07/09/25 1724     Glucose 110 mg/dL      BUN 15.6 mg/dL      Creatinine 0.93 mg/dL      Sodium 140 mmol/L      Potassium 4.2 mmol/L      Comment: Specimen hemolyzed.  Result may be falsely elevated.        Chloride 104 mmol/L      CO2 25.0 mmol/L      Calcium 9.4 mg/dL      Total Protein 6.4 g/dL      Albumin 4.3 g/dL      ALT (SGPT) 14 U/L      AST (SGOT) 18 U/L      Alkaline Phosphatase 52 U/L      Total Bilirubin 0.6 mg/dL      Globulin 2.1 gm/dL      A/G Ratio 2.0 g/dL      BUN/Creatinine Ratio 16.8     Anion Gap 11.0 mmol/L      eGFR 82.0 mL/min/1.73     Narrative:      GFR Categories in Chronic Kidney Disease (CKD)              GFR Category          GFR (mL/min/1.73)    Interpretation  G1                    90 or greater        Normal or high (1)  G2                    60-89                Mild decrease (1)  G3a                   45-59                Mild to moderate decrease  G3b                   30-44                Moderate to severe decrease  G4                    15-29                Severe  decrease  G5                    14 or less           Kidney failure    (1)In the absence of evidence of kidney disease, neither GFR category G1 or G2 fulfill the criteria for CKD.    eGFR calculation 2021 CKD-EPI creatinine equation, which does not include race as a factor    High Sensitivity Troponin T [[id number]]  (Abnormal) Collected: 07/09/25 1656    Specimen: Blood Updated: 07/09/25 1723     HS Troponin T 35 ng/L     Narrative:      High Sensitive Troponin T Reference Range:  <14.0 ng/L- Negative Female for AMI  <22.0 ng/L- Negative Male for AMI  >=14 - Abnormal Female indicating possible myocardial injury.  >=22 - Abnormal Male indicating possible myocardial injury.   Clinicians would have to utilize clinical acumen, EKG, Troponin, and serial changes to determine if it is an Acute Myocardial Infarction or myocardial injury due to an underlying chronic condition.         BNP [[id number]]  (Abnormal) Collected: 07/09/25 1656    Specimen: Blood Updated: 07/09/25 1723     proBNP 2,160.0 pg/mL     Narrative:      This assay is used as an aid in the diagnosis of individuals suspected of having heart failure. It can be used as an aid in the diagnosis of acute decompensated heart failure (ADHF) in patients presenting with signs and symptoms of ADHF to the emergency department (ED). In addition, NT-proBNP of <300 pg/mL indicates ADHF is not likely.    Age Range Result Interpretation  NT-proBNP Concentration (pg/mL:      <50             Positive            >450                   Gray                 300-450                    Negative             <300    50-75           Positive            >900                  Gray                300-900                  Negative            <300      >75             Positive            >1800                  Gray                300-1800                  Negative            <300    Protime-INR [[id number]]  (Abnormal) Collected: 07/09/25 1656    Specimen: Blood Updated: 07/09/25 7898      Protime 39.4 Seconds      INR 3.96    Extra Tubes [[id number]] Collected: 07/09/25 1646    Specimen: Blood, Venous Line Updated: 07/09/25 1701    Narrative:      The following orders were created for panel order Extra Tubes.  Procedure                               Abnormality         Status                     ---------                               -----------         ------                     Gold Top - SST[[id number]]                                   Final result                 Please view results for these tests on the individual orders.    Gold Top - SST [[id number]] Collected: 07/09/25 1646    Specimen: Blood Updated: 07/09/25 1701     Extra Tube Hold for add-ons.     Comment: Auto resulted.       CBC & Differential [[id number]]  (Abnormal) Collected: 07/09/25 1656    Specimen: Blood Updated: 07/09/25 1700    Narrative:      The following orders were created for panel order CBC & Differential.  Procedure                               Abnormality         Status                     ---------                               -----------         ------                     CBC Auto Differential[[id number]]        Abnormal            Final result                 Please view results for these tests on the individual orders.    CBC Auto Differential [[id number]]  (Abnormal) Collected: 07/09/25 1656    Specimen: Blood Updated: 07/09/25 1700     WBC 10.18 10*3/mm3      RBC 4.35 10*6/mm3      Hemoglobin 13.1 g/dL      Hematocrit 39.8 %      MCV 91.5 fL      MCH 30.1 pg      MCHC 32.9 g/dL      RDW 12.6 %      RDW-SD 42.3 fl      MPV 10.0 fL      Platelets 202 10*3/mm3      Neutrophil % 75.9 %      Lymphocyte % 15.5 %      Monocyte % 7.5 %      Eosinophil % 0.4 %      Basophil % 0.3 %      Immature Grans % 0.4 %      Neutrophils, Absolute 7.73 10*3/mm3      Lymphocytes, Absolute 1.58 10*3/mm3      Monocytes, Absolute 0.76 10*3/mm3      Eosinophils, Absolute 0.04 10*3/mm3      Basophils, Absolute 0.03 10*3/mm3      Immature  Grans, Absolute 0.04 10*3/mm3      nRBC 0.0 /100 WBC              Imaging Results (Last 24 Hours)       Procedure Component Value Units Date/Time    XR Chest 1 View [[id number]] Collected: 07/09/25 1800     Updated: 07/09/25 1802    Narrative:      XR CHEST 1 VW    Date of Exam: 7/9/2025 5:50 PM EDT    Indication: chest pain    Comparison: Chest radiograph 12/23/2024    Findings:      Mediastinum: Cardiac silhouette appears unchanged and enlarged    Lungs: The lungs appear clear without focal consolidation appreciated.    Pleura: No pleural effusion or pneumothorax.    Bones and soft tissues: No acute, displaced fracture seen.        Impression:      Impression:  No radiographic evidence of acute cardiopulmonary abnormality.        Electronically Signed: Hussein Jean MD    7/9/2025 6:00 PM EDT    Workstation ID: [id number]              Assessment & Plan    Rufino Vasques is a 80 y.o. male with a CMH of CAD status post stent x 1, A-fib on warfarin, hypertension, hyperlipidemia, GERD, arthritis, who presented to [facility] on 7/9/2025 with chest pain.    Assessment:  Chest pain  CAD status post stent x 1  Atrial fibrillation on warfarin, INR supratherapeutic  Elevated proBNP  Bilateral lower extremity edema  Hypertension  Hyperlipidemia  GERD  Arthritis    Plan:    -Chest pain: EKG shows A-fib with possible LAFB; troponins without significant delta. Given aspirin 324 mg once, now start aspirin 81 mg daily. Start Coreg 6.25 mg BID. Echo ordered. Patient n.p.o. after midnight. Cardiology consulted; awaiting further recommendations.    -CAD, s/p stent: Continue aspirin and statin therapy.     -A-fib with INR 3.96: Hold warfarin. Recheck INR in AM. Pending cardiology evaluation; if no cath planned and INR trending down, may resume warfarin cautiously.    -Elevated proBNP/BLE edema: Give IV Lasix 40 mg once. Monitor daily weights, strict I/Os. Await echo for LV function assessment.    -Hypertension: Monitor BPs.  Start Coreg 6.25 mg BID as above. Continue home antihypertensives if appropriate.    -Hyperlipidemia: Continue statin if on at home. Order lipid panel and A1c for risk stratification.    -GERD: Continue PPI therapy as per home regimen.    -Resume remaining home medications once reconciled per pharmacy or medically appropriate.    -Follow AM labs.      VTE Prophylaxis:  No VTE prophylaxis order currently exists.      CODE STATUS:    Code Status (Patient has no pulse and is not breathing): CPR (Attempt to Resuscitate)  Medical Interventions (Patient has pulse or is breathing): Full Support    I discussed the patient's findings and my recommendations with patient.      This document has been electronically signed by Dedrick Cruz MD on July 9, 2025 20:49 EDT   [facility] Hospitalist Team    MATEO LESLIE is a 72y Female transferred from [facility] facility of lethargy and hypoxia.   Patient is a terminal case of PD.   9/6/22 patient has fever, Blood and urine cultures in progress.  ID and pulmonary on the case

## 2025-07-10 NOTE — CONSULTS
"    Cardiology Consult Note    Patient Identification:  Name: Rufino Vasques  Age: 80 y.o.  Sex: male  :  1945  MRN: [id number]             Requesting Physician :  Dr. Cruz, Hospitalist     Reason for Consultation / Chief Complaint :   Chest pain     History of Present Illness:        Mr. Rufino Vasques has a PMH of      - CAD status post PCI ( details not available)   -Paroxysmal atrial fibrillation  [id number] SCORE   [id number] Score: 4 (2025  4:40 PM)     - history of Endocarditis (treated with 6 weeks IV abx)  - GERD/previous esophageal dilations  - Chronic back pain secondary to spinal stenosis ([facility] [place])  - Back surgeries x 4, right eye surgery x 5, hip replacement, cholecystectomy  - Blindness in right eye, ptosis left eye, hard of hearing  - Family history of father with valvular disease.  - No known allergies  - Non-smoker     Patient was seen in office 2025 for an acute visit for chest pain.  Patient reports mid-sternal to left sided chest pain with radiation to his left shoulder and arm.  This has been occurring for a few months intermittently but has progressively worsened this week.  Patient reports that pain occurs at rest AFTER he has done activity and has rested for about 15 minutes.  He also started having increase in his blood pressure over the last week, as well as lower extremity edema, worse in the left leg.  Patient initially reports that he \"felt great today\" then developed a \"twinge\" of pain in his arm where he grimaced and then has a episode of chest discomfort.  He reports he has not been taking aspirin due to being on warfarin.        Blood pressure in office 154/81 left arm 156/91 right arm HR 70 oxygen 99% on room air  Weight 244 lbs BMI 33         Patient had stress test in 2024 that was negative for ischemia     Patient was sent to the ED for evaluation.  Labs in the ED showed HS troponin 35--34, pro bnp 2160 CMP unremarkable except " glucose 110   INR 3.96 CBC unremarkable TSH normal.   CXR was unremarkable.  EKG in the ED showed Afib    Repeat labs this morning unremarkable, except INR still elevated at 3.78     Today patient denies any chest pain, he reports he had an episode last evening.  He states he get a gnawing feeling in his chest at times.  He denies any dark tarry stools.         Will plan for stress test vs cardiac cath given patient's INR is elevated   Echocardiogram is pending  Further assessment and plan per Dr. Aguilar       Electronically signed by CAMILLA Brennan, 07/10/25, 9:01 AM EDT.     Cardiology attending addendum :    I have personally performed a face-to-face diagnostic evaluation, physical exam and reviewed data on this patient.  I have reviewed documentation done by me and nurse practitioner  and corrected as needed.  And agree with the different components of documentation.Greater than 50% of the time spent in the care of this patient was provided by attending consultant/me.        Assessment:  :     -Recurrent chest pain with exertional component concerning for unstable angina  -Lower extremity edema  -Paroxysmal atrial fibrillation  -CAD history of PCI 2013  -Questionable history of endocarditis previously took antibiotics for 6 weeks  -Elevated proBNP at 2025  -Blind in right eye due to retinal detachment        Recommendations / Plan:         Patient presented with recurrent chest pain with some exertional component.  INR is elevated at 3.96.  Will hold warfarin..  Workup here reveals troponin of 35 and 34 no particular trend.  proBNP is elevated 2160.  EKG done 7/9/2025 reviewed/interpreted by me reveals A-fib with a rate of 75 bpm  Will schedule a stress test to guide therapy.  Patient cannot walk due to musculoskeletal reasons we will do Lexiscan Cardiolite.  Will hold warfarin pending workup.  Patient has elevated [id number]-RMUd score, will benefit from long-term anticoagulation to prevent thromboembolic  events.  Patient is on warfarin.  Currently is on hold to evaluate ischemic workup.  After ischemic workup will continue warfarin and rate control for A-fib as tolerated     Patient underwent echocardiogram 3/27/2024 which revealed normal LV systolic function, severe biatrial enlargement consistent with chronic atrial fibrillation.  Patient advised Lexiscan Cardiolite 3/27/2024 which was negative for ischemia.  Continue medical management with aspirin, atorvastatin, beta-blocker and warfarin as tolerated.  If ischemic workup is negative will follow-up with his outpatient cardiology clinic in a month      Cardiographics  ECG: EKG tracing was  personally reviewed/interpreted by me  Telemetry Scan   Final Result      Telemetry Scan   Final Result      Telemetry Scan   Final Result      ECG 12 Lead Chest Pain   Final Result   HEART RATE=75  bpm   RR Yxzywphh=757  ms   MD Interval=  ms   P Horizontal Axis=  deg   P Front Axis=  deg   QRSD Bwmhdijs=218  ms   QT Hsfqoidr=554  ms   BGwT=126  ms   QRS Axis=-45  deg   T Wave Axis=10  deg   - ABNORMAL ECG -   Atrial fibrillation   Left axis deviation, consider lafb   When compared with ECG of 26-Mar-2024 10:22:25,   Significant axis, voltage or hypertrophy change   Electronically Signed By: Red Hurst (LIZA) 2025-07-10 06:50:04   Date and Time of Study:2025-07-09 16:33:27          Telemetry: Atrial fibrillation with controlled ventricular response             Diagnosis Plan   1. Chest pain, unspecified type                             Past Medical History:  Past Medical History:   Diagnosis Date    Arthritis     Back pain     Blind right eye     CAD (coronary artery disease)     Esophagus disorder     several dilations    GERD (gastroesophageal reflux disease)     History of BPH     [place] (hard of hearing)     Lumbar stenosis with neurogenic claudication     Seasonal allergies      Past Surgical History:  Past Surgical History:   Procedure Laterality Date    BACK SURGERY       x3    CHOLECYSTECTOMY      COLONOSCOPY W/ POLYPECTOMY      CORONARY ANGIOPLASTY WITH STENT PLACEMENT      2013    ESOPHAGEAL DILATATION      EYE SURGERY      SPINAL FUSION      TOTAL HIP ARTHROPLASTY        Allergies:  No Known Allergies  Home Meds:  Medications Prior to Admission   Medication Sig Dispense Refill Last Dose/Taking    furosemide (LASIX) 40 MG tablet Take 1 tablet by mouth Daily.       HYDROcodone-acetaminophen (NORCO)  MG per tablet Take 1 tablet by mouth Every 6 (Six) Hours As Needed for Moderate Pain.       metoprolol succinate XL (Toprol XL) 25 MG 24 hr tablet Take 1 tablet by mouth Daily for 360 days. (Patient taking differently: Take 1 tablet by mouth Daily As Needed.) 90 tablet 3     omeprazole (priLOSEC) 20 MG capsule Take 1 capsule by mouth Daily.       predniSONE (DELTASONE) 50 MG tablet Take 1 tablet by mouth Daily.       warfarin (COUMADIN) 5 MG tablet Take 1 tablet by mouth Take As Directed.       [DISCONTINUED] aspirin 81 MG EC tablet Take 1 tablet by mouth Daily. (Patient not taking: Reported on 7/9/2025) 90 tablet 3      Current Meds:     Current Facility-Administered Medications:     acetaminophen (TYLENOL) tablet 650 mg, 650 mg, Oral, Q6H PRN, Lambert De Jesus MD, 650 mg at 07/10/25 1433    aspirin chewable tablet 81 mg, 81 mg, Oral, Daily, Dedrikc Cruz MD, 81 mg at 07/10/25 0931    atorvastatin (LIPITOR) tablet 40 mg, 40 mg, Oral, Nightly, Dedrick Cruz MD    sennosides-docusate (PERICOLACE) 8.6-50 MG per tablet 2 tablet, 2 tablet, Oral, BID PRN **AND** polyethylene glycol (MIRALAX) packet 17 g, 17 g, Oral, Daily PRN **AND** bisacodyl (DULCOLAX) EC tablet 5 mg, 5 mg, Oral, Daily PRN **AND** bisacodyl (DULCOLAX) suppository 10 mg, 10 mg, Rectal, Daily PRN, Dedrick Cruz MD    Calcium Replacement - Follow Nurse / BPA Driven Protocol, , Not Applicable, PRN, Dedrick Cruz MD    carvedilol (COREG) tablet 6.25 mg, 6.25 mg, Oral, BID With Meals,  Dedrick Cruz MD, 6.25 mg at 07/10/25 0931    HYDROcodone-acetaminophen (NORCO)  MG per tablet 1 tablet, 1 tablet, Oral, Q6H PRN, Dedrick Cruz MD    Magnesium Standard Dose Replacement - Follow Nurse / BPA Driven Protocol, , Not Applicable, Nancy CORTES Sukhjinder S, MD    nitroglycerin (NITROSTAT) SL tablet 0.4 mg, 0.4 mg, Sublingual, Q5 Min PRN, Dedrick Cruz MD    pantoprazole (PROTONIX) EC tablet 40 mg, 40 mg, Oral, Q AM, Dedrick Cruz MD, 40 mg at 07/10/25 0525    Phosphorus Replacement - Follow Nurse / BPA Driven Protocol, , Not Applicable, PRNancy HANNA Sukhjinder S, MD    Potassium Replacement - Follow Nurse / BPA Driven Protocol, , Not Applicable, Nancy CORTES Sukhjinder S, MD    [COMPLETED] Insert Peripheral IV, , , Once **AND** sodium chloride 0.9 % flush 10 mL, 10 mL, Intravenous, PRN, Jose Reyes MD    sodium chloride 0.9 % flush 10 mL, 10 mL, Intravenous, Q12H, Dedrick Cruz MD, 10 mL at 07/10/25 0938    sodium chloride 0.9 % flush 10 mL, 10 mL, Intravenous, PRNancy HANNA Sukhjinder S, MD    sodium chloride 0.9 % infusion 40 mL, 40 mL, Intravenous, PRN, Dedrick Cruz MD  Social History:   Social History     Tobacco Use    Smoking status: Never     Passive exposure: Never    Smokeless tobacco: Never   Substance Use Topics    Alcohol use: Not Currently      Family History:  Family History   Problem Relation Age of Onset    Malig Hyperthermia Neg Hx         Review of Systems : Review of Systems   Cardiovascular:  Positive for chest pain and leg swelling.   Respiratory:  Negative for cough.    Gastrointestinal:  Negative for abdominal pain, nausea and vomiting.          Constitutional:  Temp:  [97.2 °F (36.2 °C)-98.8 °F (37.1 °C)] 97.2 °F (36.2 °C)  Heart Rate:  [59-73] 70  Resp:  [10-19] 17  BP: (138-187)/() 159/69    Physical Exam   /69 (BP Location: Left arm, Patient Position: Sitting)   Pulse 70   Temp 97.2 °F (36.2 °C)  "(Oral)   Resp 17   Ht 182.9 cm (72\")   Wt 110 kg (243 lb)   SpO2 97%   BMI 32.96 kg/m²   Physical Exam  General:  Appears in no acute distress  Eyes: Sclerae are anicteric,  conjunctivae are clear   HEENT:  No JVD. Thyroid not visibly enlarged. No mucosal pallor or cyanosis  Respiratory: Respirations regular and unlabored at rest.  Bilaterally good breath sounds with good air entry in all fields. No crackles, rubs or wheezes auscultated  Cardiovascular: S1,S2 irregularly irregular rhythm rate controlled. No murmur, rub or gallop auscultated. + lower extremity edema.   Gastrointestinal: Abdomen nondistended.  Musculoskeletal:  No abnormal movements  Extremities: No digital clubbing or cyanosis  Skin: Color pink. Skin warm and dry to touch.   Neuro: Alert and awake, no lateralizing deficits appreciated        Echocardiogram:   Results for orders placed during the hospital encounter of 07/09/25    Adult Transthoracic Echo Complete W/ Cont if Necessary Per Protocol 07/10/2025 11:59 AM    Interpretation Summary    Left ventricular systolic function is normal. Left ventricular ejection fraction appears to be 61 - 65%.    Left ventricular diastolic function was normal.      STRESS TEST  Results for orders placed during the hospital encounter of 07/09/25    Stress Test With Myocardial Perfusion One Day 07/10/2025  1:21 PM    Interpretation Summary    Myocardial perfusion imaging indicates a normal myocardial perfusion study with no evidence of ischemia. Impressions are consistent with a low risk study.    Left ventricular ejection fraction is normal (Calculated EF = 56%).    This is normal Cardiolite imaging stress test with no evidence of ischemia or myocardial infarction.  Left ventricle size and function is normal on gated SPECT imaging.  No wall motion abnormality was noted.  Clinical correlation recommended.  Further recommendation as per ordering physician. .    Findings consistent with an indeterminate ECG " stress test.        Cardiolite (Tc-99m sestamibi) stress test    HEART CATHETERIZATION  No results found for this or any previous visit.        Imaging  Chest X-ray:   Imaging Results (Last 24 Hours)       Procedure Component Value Units Date/Time    XR Chest 1 View [[id number]] Collected: 07/09/25 1800     Updated: 07/09/25 1802    Narrative:      XR CHEST 1 VW    Date of Exam: 7/9/2025 5:50 PM EDT    Indication: chest pain    Comparison: Chest radiograph 12/23/2024    Findings:      Mediastinum: Cardiac silhouette appears unchanged and enlarged    Lungs: The lungs appear clear without focal consolidation appreciated.    Pleura: No pleural effusion or pneumothorax.    Bones and soft tissues: No acute, displaced fracture seen.        Impression:      Impression:  No radiographic evidence of acute cardiopulmonary abnormality.        Electronically Signed: Hussein Jean MD    7/9/2025 6:00 PM EDT    Workstation ID: [id number]            Lab Review: I have reviewed the labs  Results from last 7 days   Lab Units 07/09/25  1827 07/09/25  1656   HSTROP T ng/L 34* 35*     Results from last 7 days   Lab Units 07/10/25  0337   MAGNESIUM mg/dL 2.2     Results from last 7 days   Lab Units 07/10/25  0337   SODIUM mmol/L 144   POTASSIUM mmol/L 4.0   BUN mg/dL 14.3   CREATININE mg/dL 0.97   CALCIUM mg/dL 9.1         Results from last 7 days   Lab Units 07/09/25  1656   PROBNP pg/mL 2,160.0*     Results from last 7 days   Lab Units 07/10/25  0337 07/09/25  1656   WBC 10*3/mm3 7.16 10.18   HEMOGLOBIN g/dL 12.5* 13.1   HEMATOCRIT % 37.6 39.8   PLATELETS 10*3/mm3 191 202     Results from last 7 days   Lab Units 07/10/25  0337 07/09/25  1656   INR  3.78* 3.96*             Pieter Aguilar MD  7/10/2025, 17:24 EDT      EMR Dragon/Transcription:   Dictated utilizing Dragon dictation

## 2025-07-10 NOTE — ED NOTES
Nursing report ED to floor  Rufino Vasques  80 y.o.  male    HPI:   Chief Complaint   Patient presents with    Chest Pain     Pt c/o CP for past month, increased BLE swelling. Pt reports he was seen by cardio office and sent here for further eval and possible heart cath tomorrow       Admitting doctor:   Dedrick Cruz MD    Admitting diagnosis:   There were no encounter diagnoses.    Code status:   Current Code Status       Date Active Code Status Order ID Comments User Context       7/9/2025 2049 CPR (Attempt to Resuscitate) [id number]  Dedrick Cruz MD ED        Question Answer    Code Status (Patient has no pulse and is not breathing) CPR (Attempt to Resuscitate)    Medical Interventions (Patient has pulse or is breathing) Full Support                    Allergies:   Patient has no known allergies.    Isolation:  No active isolations     Fall Risk:  Fall Risk Assessment was completed, and patient is at low risk for falls.   Predictive Model Details         11 (Low) Factor Value    Calculated 7/9/2025 21:26 Age 80    Risk of Fall Model Active Peripheral IV Present     Imaging order in this encounter Present     Respiratory Rate 19     Magnesium not on file     Joe Scale not on file     Number of Distinct Medication Classes administered 2     Clinically Relevant Sex Not Female     Diastolic BP 85     Cardiac Assessment X     Total Bilirubin 0.6 mg/dL     Duration of Current Encounter 0.201 days     Calcium 9.4 mg/dL     Albumin 4.3 g/dL     Potassium 4.2 mmol/L     Creatinine 0.93 mg/dL     Chloride 104 mmol/L     ALT 14 U/L         Weight:       07/09/25  1629   Weight: 110 kg (243 lb 6.4 oz)       Intake and Output  No intake or output data in the 24 hours ending 07/09/25 2134    Diet:   Dietary Orders (From admission, onward)       Start     Ordered    07/10/25 0001  NPO Diet NPO Type: Strict NPO  Diet Effective Midnight        Question:  NPO Type  Answer:  Strict NPO    07/09/25 2049                      Most recent vitals:   Vitals:    07/09/25 2031 07/09/25 2044 07/09/25 2102 07/09/25 2117   BP: 169/86 162/82 156/93 159/85   BP Location:       Patient Position:       Pulse: 63 66 65 71   Resp:       Temp:       TempSrc:       SpO2: 97% 96% 97% 100%   Weight:       Height:           Active LDAs/IV Access:   Lines, Drains & Airways       Active LDAs       Name Placement date Placement time Site Days    Peripheral IV 07/09/25 1703 18 G Left Antecubital 07/09/25  1703  Antecubital  less than 1                    Skin Condition:   Skin Assessments (last day)       None             Labs (abnormal labs have a star):   Labs Reviewed   COMPREHENSIVE METABOLIC PANEL - Abnormal; Notable for the following components:       Result Value    Glucose 110 (*)     All other components within normal limits    Narrative:     GFR Categories in Chronic Kidney Disease (CKD)              GFR Category          GFR (mL/min/1.73)    Interpretation  G1                    90 or greater        Normal or high (1)  G2                    60-89                Mild decrease (1)  G3a                   45-59                Mild to moderate decrease  G3b                   30-44                Moderate to severe decrease  G4                    15-29                Severe decrease  G5                    14 or less           Kidney failure    (1)In the absence of evidence of kidney disease, neither GFR category G1 or G2 fulfill the criteria for CKD.    eGFR calculation 2021 CKD-EPI creatinine equation, which does not include race as a factor   TROPONIN - Abnormal; Notable for the following components:    HS Troponin T 35 (*)     All other components within normal limits    Narrative:     High Sensitive Troponin T Reference Range:  <14.0 ng/L- Negative Female for AMI  <22.0 ng/L- Negative Male for AMI  >=14 - Abnormal Female indicating possible myocardial injury.  >=22 - Abnormal Male indicating possible myocardial injury.   Clinicians would  have to utilize clinical acumen, EKG, Troponin, and serial changes to determine if it is an Acute Myocardial Infarction or myocardial injury due to an underlying chronic condition.        BNP (IN-HOUSE) - Abnormal; Notable for the following components:    proBNP 2,160.0 (*)     All other components within normal limits    Narrative:     This assay is used as an aid in the diagnosis of individuals suspected of having heart failure. It can be used as an aid in the diagnosis of acute decompensated heart failure (ADHF) in patients presenting with signs and symptoms of ADHF to the emergency department (ED). In addition, NT-proBNP of <300 pg/mL indicates ADHF is not likely.    Age Range Result Interpretation  NT-proBNP Concentration (pg/mL:      <50             Positive            >450                   Gray                 300-450                    Negative             <300    50-75           Positive            >900                  Gray                300-900                  Negative            <300      >75             Positive            >1800                  Gray                300-1800                  Negative            <300   PROTIME-INR - Abnormal; Notable for the following components:    Protime 39.4 (*)     INR 3.96 (*)     All other components within normal limits   CBC WITH AUTO DIFFERENTIAL - Abnormal; Notable for the following components:    Lymphocyte % 15.5 (*)     Neutrophils, Absolute 7.73 (*)     All other components within normal limits   HIGH SENSITIVITIY TROPONIN T 1HR - Abnormal; Notable for the following components:    HS Troponin T 34 (*)     All other components within normal limits    Narrative:     High Sensitive Troponin T Reference Range:  <14.0 ng/L- Negative Female for AMI  <22.0 ng/L- Negative Male for AMI  >=14 - Abnormal Female indicating possible myocardial injury.  >=22 - Abnormal Male indicating possible myocardial injury.   Clinicians would have to utilize clinical acumen, EKG,  Troponin, and serial changes to determine if it is an Acute Myocardial Infarction or myocardial injury due to an underlying chronic condition.        CBC AND DIFFERENTIAL    Narrative:     The following orders were created for panel order CBC & Differential.  Procedure                               Abnormality         Status                     ---------                               -----------         ------                     CBC Auto Differential[[id number]]        Abnormal            Final result                 Please view results for these tests on the individual orders.   EXTRA TUBES    Narrative:     The following orders were created for panel order Extra Tubes.  Procedure                               Abnormality         Status                     ---------                               -----------         ------                     Gold Top - SST[[id number]]                                   Final result                 Please view results for these tests on the individual orders.   GOLD TOP - SST       LOC: Person, Place, Time, and Situation    Telemetry:  Med/Surg    Cardiac Monitoring Ordered: yes    EKG:   ECG 12 Lead Chest Pain   Preliminary Result   HEART RATE=75  bpm   RR Fvexxdoi=233  ms   VA Interval=  ms   P Horizontal Axis=  deg   P Front Axis=  deg   QRSD Plgurkyr=438  ms   QT Ixmdqxbr=310  ms   LKhC=918  ms   QRS Axis=-45  deg   T Wave Axis=10  deg   - ABNORMAL ECG -   Atrial fibrillation   LAD, consider left anterior fascicular block   Date and Time of Study:2025-07-09 16:33:27          Medications Given in the ED:   Medications   sodium chloride 0.9 % flush 10 mL (has no administration in time range)   sodium chloride 0.9 % flush 10 mL (10 mL Intravenous Given 7/9/25 2049)   sodium chloride 0.9 % flush 10 mL (has no administration in time range)   sodium chloride 0.9 % infusion 40 mL (has no administration in time range)   nitroglycerin (NITROSTAT) SL tablet 0.4 mg (has no administration in  time range)   Potassium Replacement - Follow Nurse / BPA Driven Protocol (has no administration in time range)   Magnesium Standard Dose Replacement - Follow Nurse / BPA Driven Protocol (has no administration in time range)   Phosphorus Replacement - Follow Nurse / BPA Driven Protocol (has no administration in time range)   Calcium Replacement - Follow Nurse / BPA Driven Protocol (has no administration in time range)   sennosides-docusate (PERICOLACE) 8.6-50 MG per tablet 2 tablet (has no administration in time range)     And   polyethylene glycol (MIRALAX) packet 17 g (has no administration in time range)     And   bisacodyl (DULCOLAX) EC tablet 5 mg (has no administration in time range)     And   bisacodyl (DULCOLAX) suppository 10 mg (has no administration in time range)   aspirin chewable tablet 324 mg (324 mg Oral Given 7/9/25 5290)       Imaging results:  XR Chest 1 View  Result Date: 7/9/2025  Impression: No radiographic evidence of acute cardiopulmonary abnormality. Electronically Signed: Hussein Jean MD  7/9/2025 6:00 PM EDT  Workstation ID: [id number]      Social issues:   Social History     Socioeconomic History    Marital status:    Tobacco Use    Smoking status: Never     Passive exposure: Never    Smokeless tobacco: Never   Vaping Use    Vaping status: Never Used   Substance and Sexual Activity    Alcohol use: Not Currently    Drug use: Never    Sexual activity: Defer       NIH Stroke Scale:  Interval: (not recorded)  1a. Level of Consciousness: (not recorded)  1b. LOC Questions: (not recorded)  1c. LOC Commands: (not recorded)  2. Best Gaze: (not recorded)  3. Visual: (not recorded)  4. Facial Palsy: (not recorded)  5a. Motor Arm, Left: (not recorded)  5b. Motor Arm, Right: (not recorded)  6a. Motor Leg, Left: (not recorded)  6b. Motor Leg, Right: (not recorded)  7. Limb Ataxia: (not recorded)  8. Sensory: (not recorded)  9. Best Language: (not recorded)  10. Dysarthria: (not recorded)  11.  Extinction and Inattention (formerly Neglect): (not recorded)    Total (NIH Stroke Scale): (not recorded)     Additional notable assessment information:     Nursing report ED to floor:  To Nurse Steph KNOX MIPS    Sanya Lisa RN   07/09/25 21:34 EDT

## 2025-07-10 NOTE — DISCHARGE SUMMARY
[facility] Medicine Services  Discharge Summary    Date of Service: 7/10/2025  Patient Name: Rufino Vasques  : 1945  MRN: [id number]    Date of Admission: 2025  Discharge Diagnosis: Chest pain  Date of Discharge: 7/10/2025  Primary Care Physician: Sybil Fleming MD    Presenting Problem:   Chest pain [R07.9]    Active and Resolved Hospital Problems:  Active Hospital Problems    Diagnosis POA    **Chest pain [R07.9] Yes      Resolved Hospital Problems   No resolved problems to display.       Hospital Course     Hospital Course:  This is a very pleasant 80-year-old  gentleman, past medical history CAD status post PCI x 1, atrial fibrillation on warfarin, hypertension, hyperlipidemia, GERD, arthritis, who presented to the hospital with complaints of chest pain.  The patient was hospitalized, seen evaluate by his cardiologist, and was brought to the emergency room.  He was found to have mildly elevated troponin, which were flat, and therefore suspect non-MI troponin elevation.  The patient was also found to have evidence of hypertension, hyper, is reportedly noncompliant with his home medications.  He was evaluated with a stress test, which was normal, he is medically stable and fit for discharge at this time.  He is recommended to take his medications as prescribed at home.    DISCHARGE Follow Up Recommendations for labs and diagnostics: Follow-up with PCP/Cardiology    Day of Discharge     Vital Signs:  Temp:  [97.2 °F (36.2 °C)-98.8 °F (37.1 °C)] 97.2 °F (36.2 °C)  Heart Rate:  [59-73] 70  Resp:  [10-19] 17  BP: (138-187)/() 159/69    Physical Exam:  General: Alert, cooperative, no distress, AAOx3  Lungs: Clear to auscultation bilaterally, respirations unlabored  Heart: Regular rate and rhythm, S1 and S2 normal, no murmurs, no rub or gallop.  No edema.  Abdomen: Soft, nontender, bowel sounds active, no masses, no organomegaly  Neurologic: NFND  Psychiatric:  Appropriate mood and affect.      Pertinent  and/or Most Recent Results     LAB RESULTS:        Lab 07/10/25  0337 07/09/25  1656   WBC 7.16 10.18   HEMOGLOBIN 12.5* 13.1   HEMATOCRIT 37.6 39.8   PLATELETS 191 202   NEUTROS ABS 4.27 7.73*   IMMATURE GRANS (ABS) 0.02 0.04   LYMPHS ABS 2.06 1.58   MONOS ABS 0.69 0.76   EOS ABS 0.10 0.04   MCV 91.7 91.5   PROTIME 38.0* 39.4*         Lab 07/10/25  0337 07/09/25 1827 07/09/25  1656   SODIUM 144  --  140   POTASSIUM 4.0  --  4.2   CHLORIDE 109*  --  104   CO2 24.3  --  25.0   ANION GAP 10.7  --  11.0   BUN 14.3  --  15.6   CREATININE 0.97  --  0.93   EGFR 77.9  --  82.0   GLUCOSE 126*  --  110*   CALCIUM 9.1  --  9.4   MAGNESIUM 2.2  --   --    PHOSPHORUS 2.5  --   --    HEMOGLOBIN A1C  --   --  5.28   TSH  --  0.291  --          Lab 07/10/25  0337 07/09/25  1656   TOTAL PROTEIN 5.6* 6.4   ALBUMIN 3.8 4.3   GLOBULIN 1.8 2.1   ALT (SGPT) 11 14   AST (SGOT) 13 18   BILIRUBIN 0.5 0.6   ALK PHOS 46 52         Lab 07/10/25  0337 07/09/25 1827 07/09/25  1656   PROBNP  --   --  2,160.0*   HSTROP T  --  34* 35*   PROTIME 38.0*  --  39.4*   INR 3.78*  --  3.96*         Lab 07/10/25  0337   CHOLESTEROL 178   LDL CHOL 104*   HDL CHOL 56   TRIGLYCERIDES 101             Brief Urine Lab Results       None          Microbiology Results (last 10 days)       ** No results found for the last 240 hours. **            XR Chest 1 View  Result Date: 7/9/2025  Impression: Impression: No radiographic evidence of acute cardiopulmonary abnormality. Electronically Signed: Hussein Jean MD  7/9/2025 6:00 PM EDT  Workstation ID: [id number]              Results for orders placed during the hospital encounter of 07/09/25    Adult Transthoracic Echo Complete W/ Cont if Necessary Per Protocol 07/10/2025 11:59 AM    Interpretation Summary    Left ventricular systolic function is normal. Left ventricular ejection fraction appears to be 61 - 65%.    Left ventricular diastolic function was  normal.      Labs Pending at Discharge:  Pending Results       None            Procedures Performed           Consults:   Consults       Date and Time Order Name Status Description    7/10/2025  1:44 AM Inpatient Cardiology Consult Completed             Discharge Details        Discharge Medications        New Medications        Instructions Start Date   aspirin 81 MG EC tablet   81 mg, Oral, Daily      atorvastatin 40 MG tablet  Commonly known as: LIPITOR   40 mg, Oral, Nightly      carvedilol 6.25 MG tablet  Commonly known as: COREG   6.25 mg, Oral, 2 Times Daily With Meals             Continue These Medications        Instructions Start Date   furosemide 40 MG tablet  Commonly known as: LASIX   40 mg, Daily      HYDROcodone-acetaminophen  MG per tablet  Commonly known as: NORCO   1 tablet, Every 6 Hours PRN      omeprazole 20 MG capsule  Commonly known as: priLOSEC   1 capsule, Daily      warfarin 5 MG tablet  Commonly known as: COUMADIN   5 mg, Take As Directed             Stop These Medications      metoprolol succinate XL 25 MG 24 hr tablet  Commonly known as: Toprol XL     predniSONE 50 MG tablet  Commonly known as: DELTASONE              No Known Allergies    Discharge Disposition: Home or Self Care    Diet: Hospital:  Diet Order   Procedures    Diet: Cardiac; Healthy Heart (2-3 Na+); Fluid Consistency: Thin (IDDSI 0)       Discharge Activity:     CODE STATUS:   Code Status and Medical Interventions: CPR (Attempt to Resuscitate); Full Support   Ordered at: 07/09/25 2049     Code Status (Patient has no pulse and is not breathing):    CPR (Attempt to Resuscitate)     Medical Interventions (Patient has pulse or is breathing):    Full Support       No future appointments.        Time spent on Discharge including face to face service:  33 minutes    Signature: Electronically signed by Lambert De Jesus MD, MD, 07/10/25, 15:02 EDT.  [facility] Hospitalist Team

## 2025-07-10 NOTE — PLAN OF CARE
Problem: Adult Inpatient Plan of Care  Goal: Plan of Care Review  Outcome: Met  Flowsheets (Taken 7/10/2025 1643)  Outcome Evaluation: pt is Discharging Home  Goal: Patient-Specific Goal (Individualized)  Outcome: Met  Goal: Absence of Hospital-Acquired Illness or Injury  Outcome: Met  Intervention: Identify and Manage Fall Risk  Recent Flowsheet Documentation  Taken 7/10/2025 1421 by Samia Cabello RN  Safety Promotion/Fall Prevention: safety round/check completed  Taken 7/10/2025 1210 by Samia Cabello RN  Safety Promotion/Fall Prevention: patient off unit  Taken 7/10/2025 1050 by Samia Cabello RN  Safety Promotion/Fall Prevention: safety round/check completed  Taken 7/10/2025 0934 by Samia Cabello RN  Safety Promotion/Fall Prevention: activity supervised  Taken 7/10/2025 0820 by Samia Cabello RN  Safety Promotion/Fall Prevention: safety round/check completed  Intervention: Prevent Skin Injury  Recent Flowsheet Documentation  Taken 7/10/2025 0934 by Samia Cabello RN  Body Position: position changed independently  Intervention: Prevent and Manage VTE (Venous Thromboembolism) Risk  Recent Flowsheet Documentation  Taken 7/10/2025 0934 by Samia Cabello RN  VTE Prevention/Management:   patient refused intervention   SCDs (sequential compression devices) off  Intervention: Prevent Infection  Recent Flowsheet Documentation  Taken 7/10/2025 1421 by Samia Cabello RN  Infection Prevention:   hand hygiene promoted   personal protective equipment utilized   rest/sleep promoted   single patient room provided  Taken 7/10/2025 1050 by Samia Cabello RN  Infection Prevention:   hand hygiene promoted   personal protective equipment utilized   rest/sleep promoted   single patient room provided  Taken 7/10/2025 0934 by Samia Cabello RN  Infection Prevention:   hand hygiene promoted   personal protective equipment utilized   rest/sleep promoted  Taken 7/10/2025 0820 by Samia Cabello RN  Infection Prevention:   hand hygiene  promoted   personal protective equipment utilized   rest/sleep promoted   single patient room provided  Goal: Optimal Comfort and Wellbeing  Outcome: Met  Intervention: Provide Person-Centered Care  Recent Flowsheet Documentation  Taken 7/10/2025 0964 by Samia Cabello RN  Trust Relationship/Rapport:   care explained   choices provided   questions answered  Goal: Readiness for Transition of Care  Outcome: Met   Goal Outcome Evaluation:              Outcome Evaluation: pt is Discharging Home

## 2025-07-11 NOTE — CASE MANAGEMENT/SOCIAL WORK
Case Management Discharge Note      Final Note: Home.      Selected Continued Care - Discharged on 7/10/2025 Admission date: 7/9/2025 - Discharge disposition: Home or Self Care       Transportation Services  Transportation: Private Transportation  Private: Car    Final Discharge Disposition Code: 01 - home or self-care

## 2025-07-14 ENCOUNTER — TELEPHONE (OUTPATIENT)
Dept: CARDIOLOGY | Facility: CLINIC | Age: 80
End: 2025-07-14
Payer: MEDICARE

## 2025-07-14 NOTE — TELEPHONE ENCOUNTER
Please call patient and let him know Myoview study was negative or low risk. No ischemia or MI noted on exam, normal LVEF.    Echocardiogram also normal with left ventricular ejection fraction of 61 to 65%, normal is 50 to 70%.  Diastolic function was also normal.  No significant valvular abnormality noted.    Thank you and please let me know if patient has any further questions or concerns